# Patient Record
Sex: FEMALE | Race: WHITE | Employment: UNEMPLOYED | URBAN - METROPOLITAN AREA
[De-identification: names, ages, dates, MRNs, and addresses within clinical notes are randomized per-mention and may not be internally consistent; named-entity substitution may affect disease eponyms.]

---

## 2017-01-05 ENCOUNTER — HOSPITAL ENCOUNTER (OUTPATIENT)
Dept: WOUND CARE | Age: 82
Discharge: HOME | End: 2017-01-05
Payer: MEDICARE

## 2017-01-05 PROCEDURE — 97598 DBRDMT OPN WND ADDL 20CM/<: CPT

## 2017-01-05 PROCEDURE — 97597 DBRDMT OPN WND 1ST 20 CM/<: CPT

## 2017-01-05 PROCEDURE — 74011000250 HC RX REV CODE- 250: Performed by: OTOLARYNGOLOGY

## 2017-01-05 RX ORDER — LIDOCAINE HYDROCHLORIDE 20 MG/ML
JELLY TOPICAL
Status: COMPLETED | OUTPATIENT
Start: 2017-01-05 | End: 2017-01-05

## 2017-01-05 RX ADMIN — LIDOCAINE HYDROCHLORIDE: 20 JELLY TOPICAL at 10:29

## 2017-01-05 NOTE — PROGRESS NOTES
1500 [facility] [place] [address]   WOUND CARE PROGRESS NOTE       Name:  Jaqui Colorado   MR#:  [id number]   :  10/05/1931   Account #:  [de-identified]        Date of Adm:  2017       DATE OF SERVICE: 2017. 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 She is finally scheduled for ABIs, TBIs, and PVRs which will be done 6   days from today. She has had no changes in her medications, allergies, or review of   systems. OBJECTIVE: His temperature is 98.2, pulse 64, respirations 18, blood   pressure 125/78. Wound #1, which is the left lateral anterior lower leg   wound, measures 2.5 x 2.3 x 0.1 cm. It is filled with slough. Wound #4, which is the right distal lateral lower leg wound ,is 5.7 x 4.4   x 0.1 cm and also filled with slough and wound #5, which is the right   posterior lower leg wound, measures 1 x 2 x 0.1 cm and is also filled   with slough. Topical Xylocaine gel 4% was applied to all 3 wounds for 15 minutes. I   explained the risks and benefits of wound debridement. The patient   understands and wishes to proceed. Therefore, with the use of a 5 mm   ring curette, all 3 wounds were completely debrided of slough and   debris and was taken down to the level of underlying muscle. Hemostasis was achieved with gentle pressure. ASSESSMENT AND PLAN: We are going to switch the dressing to   [facility] Ready which will stay in place for a week. We will apply   a double layer of Tubigrips. I have again asked the patient to elevate   her legs, which she is not doing that all. Her family is going to order a   hospital bed to try to help her keep the legs in a more elevated   position. She will have her vascular studies in 6 days.  I will see her   again in followup in 1 week and we will decide at that time if she could tolerate compression 3-layer wraps. She does have a history of heart   failure and, therefore, we have to go slowly and judiciously with the   compression. All questions were answered. CONDITION ON DISCHARGE: Stable.         MD Clare Starr / Ram[facility]   D:  01/05/2017   11:18   T:  01/05/2017   11:35   Job #:  [id number]

## 2017-01-11 ENCOUNTER — HOSPITAL ENCOUNTER (OUTPATIENT)
Dept: VASCULAR SURGERY | Age: 82
Discharge: HOME | End: 2017-01-11
Attending: OTOLARYNGOLOGY
Payer: MEDICARE

## 2017-01-11 PROCEDURE — 93922 UPR/L XTREMITY ART 2 LEVELS: CPT

## 2017-01-11 NOTE — PROCEDURES
[facility]  *** FINAL REPORT ***    Name: Alan Pitts  MRN: [id number]    Outpatient  : 05 Oct 1931  HIS Order #: [id number]  [address] [facility] Visit #: [id number]  Date: 2017    TYPE OF TEST: Peripheral Arterial Testing    REASON FOR TEST  Extremity ulceration (both sides)    Right Leg  Segmentals: Normal                     mmHg  Brachial         137  High thigh  Low thigh  Calf  Posterior tibial 155  Dorsalis pedis   160  Peroneal  Metatarsal  Toe pressure      86  Doppler:  PVR:  Ankle/Brachial: 1.08    Left Leg  Segmentals: Normal                     mmHg  Brachial         148  High thigh  Low thigh  Calf  Posterior tibial 198  Dorsalis pedis   159  Peroneal  Metatarsal  Toe pressure      87  Doppler:  PVR:  Ankle/Brachial: 1.34    INTERPRETATION/FINDINGS  PROCEDURE:  Evaluation of lower extremity arteries with systolic blood   pressure measurement at the ankle and brachial level and calculation  of the ankle/brachial pressure index (DUSTIN). Unless otherwise  indicated, the exam also includes pulse volume recording (PVR)  plethysmography at the ankle level. FINDINGS:  DUSTIN is 1.08 on the right and  1.34 falsely elevated on the  left . PVR waveforms are mild at the right and left ankle. IMPRESSION:  There is no evidence of hemodynamically significant lower   extremity arterial obstruction . Great toe preasure are normal.    ADDITIONAL COMMENTS    I have personally reviewed the data relevant to the interpretation of  this  study. TECHNOLOGIST: Pino Amato.  Yusra Carter  Signed: 2017 03:55 PM    PHYSICIAN: Michele Peng MD  Signed: 2017 07:22 AM

## 2017-01-12 ENCOUNTER — HOSPITAL ENCOUNTER (OUTPATIENT)
Dept: WOUND CARE | Age: 82
Discharge: HOME | End: 2017-01-12
Payer: MEDICARE

## 2017-01-12 PROCEDURE — 29581 APPL MULTLAYER CMPRN SYS LEG: CPT

## 2017-01-12 RX ORDER — LIDOCAINE HYDROCHLORIDE 20 MG/ML
JELLY TOPICAL
Status: COMPLETED | OUTPATIENT
Start: 2017-01-12 | End: 2017-01-12

## 2017-01-12 RX ADMIN — LIDOCAINE HYDROCHLORIDE 2 ML: 20 JELLY TOPICAL at 10:26

## 2017-01-12 NOTE — PROGRESS NOTES
1500 [facility] [place] [address]   WOUND CARE PROGRESS NOTE       Name:  Olga Emanuel   MR#:  [id number]   :  10/05/1931   Account #:  [de-identified]        Date of Adm:  2017       DATE OF SERVICE: 2017    SUBJECTIVE: The patient returns for her bilateral venous leg ulcers   (I87.313), which goes down to the layer of fat (L97.912), with   secondary lymphedema (I89.0). She had vascular studies, which I reviewed and interpreted for her and   her daughter. Her right DUSTIN was 1.08, with normal segmentals, and her   toe pressure was 0.63. The left DUSTIN was 1.34, which is falsely elevated,   her toe pressures is 0.59, and she has mild attenuation at both ankles. I reviewed her chart, and a little confused, because one says that she   is on Bumex 2 mg/day, another one says she is on Bumex 4 mg/day   as well as spironolactone. She has had no other problems over the past week, no changes in her   medications, allergies or review of systems. OBJECTIVE: Her temperature is 97.2, pulse 56, respirations 16, blood   pressure 123/65. Wound #1, which is the left lateral anterior lower leg wound, is smaller   at 1.5 x 2 x 0.2 cm. Wound #4, which is the right distal lateral lower leg wound, is smaller   at 5 x 4 x 0.2 cm. Wound #5, the right posterior lower leg, is slightly   longer, yet narrower, at 2 x 1.3 x 0.2 cm. All wounds are covered with   slough. Wound debridement was recommended. I explained the risks   and benefits. She and her daughter understand and wish to proceed. Therefore, topical Xylocaine gel 2% was applied for 10 minutes. With   the use of a 5 mm ring curette, the wounds were debrided of all slough. Her legs are severely edematous. She has lymphedema, which   extends all the way to her toes.     ASSESSMENT AND PLAN: I have explained to the patient and her   daughter that we are going to switch to [facility] Ready, and   instead of the double Tubigrips, we will apply 3-layer wraps. Instead of   using 50% stretch, we are going to use only 25% stretch, in an attempt   to give mild compression without tipping her over into heart failure. I   called Dr. Chantal Ghosh and left a voicemail, and I would like to discuss   with him her diuretics and whether or not these can be increased, and   just have him aware that she might have issues with mobilizing all the   leg edema. At 8 cm from the medial instep, her right ankle was 29 cm in   circumference, and the left is 31. At 30 cm from the medial instep, the   right calf is 44.5, and the left is 52.5 cm, which is showing she has   much more edema on the left than on the right. All questions were answered. Her condition on discharge is stable. She   was encouraged again to elevate her legs at all times, and we are   going to call her nursing home to make sure they know, and her   daughter is going to call her nursing home to make sure they are also   in compliance.         MD DALLAS Olivera / [facility]   D:  01/12/2017   10:58   T:  01/12/2017   11:27   Job #:  [id number]

## 2017-01-19 ENCOUNTER — HOSPITAL ENCOUNTER (OUTPATIENT)
Dept: WOUND CARE | Age: 82
Discharge: HOME | End: 2017-01-19
Payer: MEDICARE

## 2017-01-19 PROCEDURE — 97597 DBRDMT OPN WND 1ST 20 CM/<: CPT

## 2017-01-19 PROCEDURE — 97598 DBRDMT OPN WND ADDL 20CM/<: CPT

## 2017-01-19 RX ORDER — LIDOCAINE HYDROCHLORIDE 20 MG/ML
JELLY TOPICAL
Status: COMPLETED | OUTPATIENT
Start: 2017-01-19 | End: 2017-01-19

## 2017-01-19 RX ADMIN — LIDOCAINE HYDROCHLORIDE 5 ML: 20 JELLY TOPICAL at 13:30

## 2017-01-19 NOTE — PROGRESS NOTES
1500 [facility] [place] [address]   WOUND CARE PROGRESS NOTE       Name:  Juana Robins   MR#:  [id number]   :  10/05/1931   Account #:  [de-identified]        Date of Adm:  2017       DATE OF SERVICE: 2017    SUBJECTIVE: The patient returns for treatment of her bilateral venous   leg ulcers (I87.313), which go down to the level of fat (L97.912) with   secondary lymphedema (I89.0). Last week, we applied Hydrofera Blue Ready and 3-layer wraps   bilaterally with only 25% stretch. I spoke to Dr. Claudio Tillman and we   discussed the plan of her legs with  elevation, compression and   diuretics. I just need him to be aware of her change in treatment so   that if we tipped her over into heart failure, he would be aware of her status. She has tolerated 3-layer wraps without difficulty over the week. She   denies any changes in her medications, allergies or review of systems. OBJECTIVE: Her temperature today is 97.9, pulse 60, respirations 16,   blood pressure 106/67. The edema of lower extremities persist, but the legs are not nearly as   taut as previously. She still has 3 wounds. Wound #1 of the left lateral   anterior lower leg is 2 x 2 x 0.2 cm with a layer of slough over it; wound   #4 of the right distal lateral lower leg is 5.3 x 3.3 x 0.2 cm; and wound   #5 of the right posterior lower leg is 1.5 x 1 x 0.1 cm. I have explained to the patient the indications for wound debridement   as well as the possible risks and benefits. She understands and wishes   to proceed. Therefore, topical Xylocaine gel 2% applied for 10 minutes. Using the 5-mm ring curette, all wounds were debrided of slough to get   down to underlying healthy bleeding soft tissue. Hemostasis was   achieved with gentle pressure. ASSESSMENT AND PLAN: For the bilateral venous leg ulcers, we are   going to use Debbie AG since there is no sign of infection.  We are   going to increase the strength of compression to 50% stretch. I again   spoke to the patient at length about the need to keep her legs elevated   at all times. She will return to the office in 1 week. All questions were   answered. CONDITION ON DISCHARGE: Stable.         MD DALLAS Galvin / MONIKA   D:  01/19/2017   13:32   T:  01/19/2017   13:58   Job #:  [id number]

## 2017-01-26 ENCOUNTER — HOSPITAL ENCOUNTER (OUTPATIENT)
Dept: WOUND CARE | Age: 82
Discharge: HOME | End: 2017-01-26
Payer: MEDICARE

## 2017-01-26 PROCEDURE — 97598 DBRDMT OPN WND ADDL 20CM/<: CPT

## 2017-01-26 PROCEDURE — 97597 DBRDMT OPN WND 1ST 20 CM/<: CPT

## 2017-01-26 RX ORDER — LIDOCAINE HYDROCHLORIDE 20 MG/ML
JELLY TOPICAL
Status: COMPLETED | OUTPATIENT
Start: 2017-01-26 | End: 2017-01-26

## 2017-01-26 RX ADMIN — LIDOCAINE HYDROCHLORIDE: 20 JELLY TOPICAL at 09:35

## 2017-01-26 NOTE — PROGRESS NOTES
[address] [facility] [place] [address]   WOUND CARE PROGRESS NOTE       Name:  Juana Robins   MR#:  [id number]   :  10/05/1931   Account #:  [de-identified]        Date of Adm:  2017       DATE OF SERVICE: 2016. SUBJECTIVE: The patient returns for her bilateral venous leg ulcers,   I87.313, which go down to the level of fat, L97.912. She has still been sitting with her legs in recombinant dependent   position. She has had no changes in her medications, allergies, or   review of systems. She is tolerating 3-layer wraps without difficulty. OBJECTIVE: Her temperature is 97.9, pulse 67, respiration 18, blood   pressure 111/69. Wound #1 of the left lateral anterior lower leg is 3.5 x 2.5 x 0.1 cm. It is   covered with a layer of slough and wound debridement has been   recommended. Wound #4 the right distal lateral lower leg is 5.5 x 3.5 x 0.1 cm, which   also had a layer of slough and wound #5, the right posterior lower leg,   is 2.1 x 0.1 cm and is clean. I explained the risks and benefits of wound debridement. The patient   understands and wishes to proceed. Therefore, topical Xylocaine gel   2% was applied for 10 minutes. With use of a 5 mm ring curette, the   wounds were debrided of slough. There is nice healthy granulation   tissue in the wound beds bilaterally. Hemostasis was achieved with   pressure until dry. ASSESSMENT AND PLAN: For the venous leg ulcers, we are going to   continue Debbie AG for the next week, followed by 3-layer wraps. I   spoke to the patient's daughter about keeping her legs elevated and   considering keeping her in a reclining chair or keeping her in bed more   and hopefully she will be able to do that, I spoke to her at length about   this, the patient, but due to her memory issues, she was unable to   recall this information. All questions were answered. CONDITION ON DISCHARGE: Stable.         Jon Gamez MD Luis Herrera / Mary Kate.[facility]   D:  01/26/2017   10:11   T:  01/26/2017   12:39   Job #:  [id number]

## 2017-02-02 ENCOUNTER — APPOINTMENT (OUTPATIENT)
Dept: WOUND CARE | Age: 82
End: 2017-02-02
Payer: MEDICARE

## 2017-02-09 ENCOUNTER — HOSPITAL ENCOUNTER (OUTPATIENT)
Dept: WOUND CARE | Age: 82
Discharge: HOME | End: 2017-02-09
Payer: MEDICARE

## 2017-02-09 PROCEDURE — 99214 OFFICE O/P EST MOD 30 MIN: CPT

## 2017-02-09 RX ORDER — LIDOCAINE HYDROCHLORIDE 20 MG/ML
JELLY TOPICAL AS NEEDED
Status: DISCONTINUED | OUTPATIENT
Start: 2017-02-09 | End: 2017-02-13 | Stop reason: HOSPADM

## 2017-02-09 NOTE — PROGRESS NOTES
1500 [facility] [place] [address]   WOUND CARE PROGRESS NOTE       Name:  Juana Robins   MR#:  [id number]   :  10/05/1931   Account #:  [de-identified]        Date of Adm:  2017       DATE OF SERVICE: 2017    SUBJECTIVE: The patient returns for her bilateral venous leg ulcers   and lymphedema. She was unable to keep last week's appointment so   we had home health apply 3-layer wraps. OBJECTIVE: Her temperature today is 98, pulse 78, respirations 18,   blood pressure 122/74. Examination showed that the patient had 3-  layer wraps on that were much too short and did not come up   anywhere near her knees. Therefore, she had very thin, tight distal   legs and ballooning proximally. Wound #1, which is the left lateral anterior lower leg wound is smaller,   2.6 x 2.0 x 0.1 cm. Wound #4, which is the right distal lateral lower leg   wound is small at 3.0 x 3.5 x 0.1 cm, and wound #5, which is the right   posterior lower leg wound is smaller at 2.0 x 1.0 x 0.1 cm. ASSESSMENT AND PLAN: For the venous leg ulcers and   lymphedema, we are going to continue Debbie AG and 3-layer wraps   and leg elevation. If the patient is unable to come for next week's visit,   we will give her TubiGrips to be applied rather than have 3-layer wraps   applied. We are going to call and speak to the nursing supervisor at   [facility] just to make sure that they understand how we wish for   these to be applied again in the future. All questions were answered. CONDITION ON DISCHARGE: Stable.         MD DALLAS Taylor / Jeanine Moore   D:  2017   11:13   T:  2017   11:28   Job #:  [id number]

## 2017-02-23 ENCOUNTER — APPOINTMENT (OUTPATIENT)
Dept: WOUND CARE | Age: 82
End: 2017-02-23
Payer: MEDICARE

## 2017-03-02 ENCOUNTER — HOSPITAL ENCOUNTER (OUTPATIENT)
Dept: WOUND CARE | Age: 82
Discharge: HOME | End: 2017-03-02
Payer: MEDICARE

## 2017-03-02 PROCEDURE — 97597 DBRDMT OPN WND 1ST 20 CM/<: CPT

## 2017-03-02 PROCEDURE — 97598 DBRDMT OPN WND ADDL 20CM/<: CPT

## 2017-03-02 NOTE — PROGRESS NOTES
[address], [address] [place] [address]   WOUND CARE PROGRESS NOTE       Name:  Bear Chiang   MR#:  [id number]   :  10/05/1931   Account #:  [de-identified]        Date of Adm:  2017       DATE OF SERVICE: 2017    SUBJECTIVE: The patient returns for the first time since 2017. At that time, her wounds were small and doing quite well. She was   instructed to return on 2017. She was unable to keep that visit. She also missed 2017 and presents today. There have been no   other changes in her medications, allergies, or review of systems. Since she was unable to use her 3-layer wraps and they lost her   Tubigrips at her nursing home, the nurses came and applied Coban to   her most distal lower extremities just above the ankles. OBJECTIVE: Her temperature today is 97.8, pulse 64, respirations 16,   blood pressure 99/67. Both lower extremities show severe stricturing under the Coban and   the wounds are larger as a consequence of the lack of the 3-layer   wraps and with the use of other modalities. Wound #1, which is the left lateral anterior lower leg wound, is 2.5 x 3 x   0.1 cm; wound #4, the right distolateral lower leg, is 6 x 3 x 0.1 cm; and   wound #5, which is the right posterior lower leg wound, is improved to   1.5 x 0.5 x 0.1 cm. Wounds #1 and #4 are covered with slough. It is recommended that   these be debrided. I explained the risks and benefits. The patient   understands and wished to proceed. PROCEDURE: Therefore, topical Xylocaine gel 2% was applied for 10   minutes. With the use of a 5 mm ring curette, the wounds were   debrided until nice healthy bleeding tissue was encountered. Hemostasis was achieved with gentle pressure. ASSESSMENT AND PLAN: I have explained to the patient and her   daughter that the wounds have certainly taken a step back. We are   going to still use Debbie AG and 3-layer wraps.  I strongly   recommended leg elevation at all times. The patient should even wear   a note pinned to her shirt so that whenever the nurses walk by they will   be reminded that her legs should be elevated. The patient and her daughter   were each given a set of double Tubigrips to be used so that if the   patient does not come to next week's visit, they will have Tubigrips   available, but hopefully she will be able to keep her appointment so we   can start making progress again with the status of these wounds. All questions were answered. Her condition on discharge is stable. She   will return in 1 week and will see Dr. Anny Boykin during my absence from   the office.          MD DALLAS Borjas / RAFAL   D:  03/02/2017   12:02   T:  03/02/2017   13:04   Job #:  [id number]

## 2017-03-09 ENCOUNTER — HOSPITAL ENCOUNTER (OUTPATIENT)
Dept: WOUND CARE | Age: 82
Discharge: HOME | End: 2017-03-09
Payer: MEDICARE

## 2017-03-09 PROCEDURE — 97597 DBRDMT OPN WND 1ST 20 CM/<: CPT

## 2017-03-09 RX ORDER — LIDOCAINE HYDROCHLORIDE 20 MG/ML
JELLY TOPICAL
Status: COMPLETED | OUTPATIENT
Start: 2017-03-09 | End: 2017-03-09

## 2017-03-09 RX ADMIN — LIDOCAINE HYDROCHLORIDE: 20 JELLY TOPICAL at 10:57

## 2017-03-09 NOTE — PROGRESS NOTES
295 [address], [address] [place] [address]   WOUND CARE PROGRESS NOTE       Name:  Bear Chiang   MR#:  [id number]   :  10/05/1931   Account #:  [de-identified]        Date of Adm:  2017       DATE OF SERVICE: 2017    SUBJECTIVE: The patient is seen in followup for venous stasis   disease bilaterally. She looks much better than the last time I saw her,   although the measurements of her wounds were not much better than   this last week. OBJECTIVE: On physical examination, she is interactive, but she falls   asleep quite easily and has been noticed previously, there is a   component of dementia. VITAL SIGNS: Today include a temperature 97.6, pulse is 64 and   regular, respiratory rate is 16 and blood pressure is 122/76. EXTREMITIES: Examination of her lower extremities shows me that   basically there are 3 wounds that are still of some concern. The left   lateral anterior leg has a wound that is    2.5 x 2.8 x 0.1. The depth of this wound actually looks like it is   epithelialized, although my original worry was that it was fibrous exudate. As I attempted to debride that there was a little bleeding, which I   touched with silver nitrate. The right distal lower leg is 6 x 3 x 0.1, has   a red nodular base and actually looks pretty healthy as does the right   posterior lower leg, which is 1.5 x 0.7 x 0.1. The right leg seems   basically unchanged and has rather healthy-looking tissue in the base   with benign edges and benign periwound. The left wound   has that curious, almost epithelialized look in the base and I limited my   debridement on that account. The debridement was undertaken after the patient's permission   on record for lidocaine, which was allowed to become effective, and it   was tolerated quite well with the bleeding controlled as noted with   silver nitrate cautery.  She will be back in just about a week so that we   can reassess the situation and consider perhaps endoform or PuraPly if that is    available for the right lateral leg.         Osborne Mate, MD Claudene Divine Diania Blamer   D:  03/09/2017   12:31   T:  03/09/2017   14:17   Job #:  [id number]

## 2017-03-16 ENCOUNTER — HOSPITAL ENCOUNTER (OUTPATIENT)
Dept: WOUND CARE | Age: 82
Discharge: HOME | End: 2017-03-16
Payer: MEDICARE

## 2017-03-16 PROCEDURE — 29581 APPL MULTLAYER CMPRN SYS LEG: CPT

## 2017-03-16 NOTE — PROGRESS NOTES
1500 [facility] [place] [address]   WOUND CARE PROGRESS NOTE       Name:  Fe Lindquist   MR#:  [id number]   :  10/05/1931   Account #:  [de-identified]        Date of Adm:  2017       DATE OF SERVICE: 2017. SUBJECTIVE: The patient returns with her daughter for her bilateral   venous leg ulcers (I87.313). We have been using Debbie AG and 3-  layer wraps. She has had no problems over the past week, nor   changes in her medications, allergies, or review of systems. OBJECTIVE: Today, she is quite tired and she is sleeping comfortably, but   easily arousable. Her pulse is 59, respirations 22, blood pressure   128/73. Wound #1, which is the wound of the left lateral anterior lower leg, is   slightly larger at 3 x 2.8 x 0.1 cm and the 3-layer wrap and slipped down   a bit and is not under the knee. Wound #4, the right distal lateral lower leg wound, is unchanged at 6 x   3 x 0.1 cm, but is quite clean and healthy with nice beefy red granular   base. Wound #5, which the right posterior lower leg, is unchanged in its   dimensions at 1.5 x 0.7 x 0.1 cm. Also a nice clean healthy wound. ASSESSMENT AND PLAN: For the venous leg ulcers, we are going to   reapply Debbie AG and 3-layer wraps. I am going to order PuraPly to   apply to all 3 wounds since I believe these wounds might be chronically   colonized and I would like to try that modality for 3 or 4 weeks. Even if the   wounds are not improving at that time, I would then switch to Grafix   since we should have healthier wound bases. The patient will continue to elevate her legs as much as possible. She   is supposed to get the new extensions of her wheelchair, so that her   legs can be elevated and hopefully this will help her compliance. All   questions were answered. CONDITION ON DISCHARGE: Stable.         MD Fox Borjas / Bianca   D:  2017   11:21   T:  2017   11:42   Job #:  [id number]

## 2017-03-23 ENCOUNTER — HOSPITAL ENCOUNTER (OUTPATIENT)
Dept: WOUND CARE | Age: 82
Discharge: HOME | End: 2017-03-23
Payer: MEDICARE

## 2017-03-23 PROCEDURE — 15271 SKIN SUB GRAFT TRNK/ARM/LEG: CPT

## 2017-03-23 RX ORDER — LIDOCAINE HYDROCHLORIDE 20 MG/ML
JELLY TOPICAL ONCE
Status: COMPLETED | OUTPATIENT
Start: 2017-03-23 | End: 2017-03-23

## 2017-03-23 RX ADMIN — LIDOCAINE HYDROCHLORIDE 5 ML: 20 JELLY TOPICAL at 11:20

## 2017-03-23 NOTE — PROGRESS NOTES
[address], [address] [place] [address]   WOUND CARE PROGRESS NOTE       Name:  Terri Greene   MR#:  [id number]   :  10/05/1931   Account #:  [de-identified]        Date of Adm:  2017       DATE OF SERVICE: 2017    SUBJECTIVE: The patient returns with her daughter. She has had a   good week. There have been no changes in her medications, allergies   or review of systems. When I first went into the room, she was asleep. She was arousable,   but she snores loudly and is sleep-deprived and the concern is   whether or not she has obstructive sleep apnea. I discussed this with   her daughter, but her daughter's concern is that the treatment would   be CPAP or BiPAP and she does not feel her mother would keep that   in place any event. She is going to sleep quite late at night and   sleeping all morning and it seems like she is sleeping more during the   day and less at night and they are going to work on her sleep hygiene   at her nursing home. OBJECTIVE: Her temperature today is 97.5, pulse 55, respirations 16,   blood pressure 149/79. Wound #1, which is the left lateral anterior lower leg, is smaller at 2.5 x   1.7 x 0.1 cm and is covered with a layer of slough. Wound #4, which is   the right distal lateral lower leg, is larger at 7.4 x 3 x 0.1 cm and has 3   distinct wounds with the middle one being the largest of the 3. All 3   are covered with slough. Wound #5, which is the wound of the right posterior lower leg, has now   healed and has a dry scab in place. I recommend that the wounds be debrided. I explained the risks and   benefits. The patient and her daughter understand and wish to   proceed. I also explained that we are going to apply PuraPly to the   wounds of both lower extremities to try to stimulate healing and to keep   the wounds clean and sterile. Therefore, topical Xylocaine gel 2% was applied for 10 minutes.  With   the use of a 5 mm ring curette, all wounds were debrided as a   selective debridement. All slough was removed. Hemostasis was   achieved with firm pressure. There was greater bleeding from the   wound of the right lower extremity than the left, so these veins were   under great pressure. Hemostasis was achieved with pressure. A 5 x 5 cm sheet of fenestrated  PuraPly was then cut into 4 pieces and used to cover the wounds of   both lower extremities. Skin prep was applied, followed by Mepitel,   followed by more skin prep and Steri-Strips. The wounds were then   covered with dry gauze and bilateral 3-layer wraps. ASSESSMENT AND PLAN: For the bilateral venous leg ulcers, we are   going to encourage the patient to keep her legs elevated at all times. No dressing changes should be done at the nursing home this week. If   the wraps become dislodged or saturated, they will simply call us and   the patient will come for a nursing visit at which time the 3-layer wraps   will be reapplied by our nurses here. The patient will return to this office in 1 week. We will reapply   PuraPly at that time. All questions were answered. CONDITION ON DISCHARGE: Stable.          MD DALLAS Wolfe / Giovany Palacios   D:  03/23/2017   11:48   T:  03/23/2017   12:16   Job #:  [id number]

## 2017-03-30 ENCOUNTER — HOSPITAL ENCOUNTER (OUTPATIENT)
Dept: WOUND CARE | Age: 82
Discharge: HOME | End: 2017-03-30
Payer: MEDICARE

## 2017-03-30 PROCEDURE — 15271 SKIN SUB GRAFT TRNK/ARM/LEG: CPT

## 2017-03-30 NOTE — PROGRESS NOTES
[address]   [place], [address]   WOUND CARE PROGRESS NOTE       Name:  Nancy Osei   MR#:  [id number]   :  10/05/1931   Account #:  [de-identified]        Date of Adm:  2017       The patient returns for her bilateral venous leg ulcers, which only   involve skin breakdown (I87.313, L97.911). She has had a good week. She has had no problems. No changes in her medications, allergies or   12-point review of systems. Temperature 97.8, pulse 66, respirations 18, blood pressure 105/63. Wound #1, which is the wound of the left lateral anterior lower leg, is   2.5 x 2 x 0.1 cm. It is still with nice clean, healthy granulation tissue   and is not in need of debridement. Wound #4, which is 3 wounds of the right distal lateral lower leg,   measures 7.5 x 3.2 x 0.1 cm. These 3 wounds are also clean and not in   need of debridement. The previous wound of the posterior leg remains   healthy and intact and healed. I again recommend that we reapply PuraPly to the wounds. I explained   the risks and benefits, she and her daughter understand and wish to   proceed. Therefore, a 5 x 5 cm sheet of PuraPly was cut and placed   over the 4 individual wounds to cover each wound in its entirety. These   were then covered with Mepitel followed by skin prep and Steri-Strips   followed by bilateral 3-layer wraps. ASSESSMENT AND PLAN: For the bilateral venous leg ulcers, we are   going to continue the PuraPly this week and 3-layer wraps, which will   stay in place for the week. The patient will keep her legs elevated as   much as possible. We will see her again in followup in 1 week. We will   plan on repeating the PuraPly at that time and as soon as the wounds   are stable enough and healthy enough, we will then switch to Grafix to   see if we can get wound closure. All questions were answered. CONDITION ON DISCHARGE: Stable.          MD DALLAS Babin / [place]annah CUELLO: 03/30/2017   11:01   T:  03/30/2017   11:28   Job #:  [id number]

## 2017-04-06 ENCOUNTER — HOSPITAL ENCOUNTER (OUTPATIENT)
Dept: WOUND CARE | Age: 82
Discharge: HOME | End: 2017-04-06
Payer: MEDICARE

## 2017-04-06 PROCEDURE — 15271 SKIN SUB GRAFT TRNK/ARM/LEG: CPT

## 2017-04-06 RX ORDER — LIDOCAINE HYDROCHLORIDE 20 MG/ML
JELLY TOPICAL ONCE
Status: COMPLETED | OUTPATIENT
Start: 2017-04-06 | End: 2017-04-06

## 2017-04-06 RX ADMIN — LIDOCAINE HYDROCHLORIDE 5 ML: 20 JELLY TOPICAL at 11:11

## 2017-04-06 NOTE — PROGRESS NOTES
[address] [facility] [address] [place] [address], [address]   WOUND CARE PROGRESS NOTE       Name:  Luis Decker   MR#:  [id number]   :  10/05/1931   Account #:  [de-identified]        Date of Adm:  2017       DATE OF SERVICE: 2017    SUBJECTIVE: The patient returns for her bilateral venous leg ulcers,   which only involves skin breakdown (I87.313, L97.911). She has had a   good week. There have been no changes in her medications, allergies,   or 12-point review of systems. OBJECTIVE: She is awake and alert today, which is a welcome sign. Her temperature is 98, pulse 60, respirations 16, blood pressure 99/62. Wound #1, which is one of the left lateral anterior lower leg, is smaller   at 2.4 x 1.8 x 0.1 cm, and the 3 wounds that make wound #4 of the   right distal lateral lower leg are also significantly smaller at 5 x 3 x 0.1   cm. It is recommended that the wound be debrided prior to application of   apply PuraPly just to remove slough and any devascularized tissue. I   explained the risks and benefits. She understands and wishes to   proceed. Therefore, topical Xylocaine gel 2% was applied for 10   minutes. With use of a 7 mm ring curette, all 4 wounds were debrided   to get down to underlying healthy bleeding tissue. Then 2 pieces of 2 x   4 cm PuraPly were placed over the primary wounds, which is the   middle wound on the right side and the wound of the left lower   extremity. Then small pieces of Endoform were cut to place over the   proximal and distal wounds of the right lower extremity. The periwound   wound was prepped with skin prep, followed by application of Mepitel   and Steri-Strips, followed by bilateral 3-layer wraps. ASSESSMENT AND PLAN: For the bilateral venous leg ulcers, which   only involves skin breakdown, the patient will continue to elevate her   legs.  She will continue to do gastrocnemius muscle exercises to   improve venous outflow, and I will see her again in followup in 1 week. All questions were answered. CONDITION ON DISCHARGE: Stable.         MD DALLAS Melchor / RAFAL   D:  04/06/2017   12:04   T:  04/06/2017   13:56   Job #:  [id number]

## 2017-04-13 ENCOUNTER — HOSPITAL ENCOUNTER (OUTPATIENT)
Dept: WOUND CARE | Age: 82
Discharge: HOME | End: 2017-04-13
Payer: MEDICARE

## 2017-04-13 PROCEDURE — 17250 CHEM CAUT OF GRANLTJ TISSUE: CPT

## 2017-04-13 PROCEDURE — 29581 APPL MULTLAYER CMPRN SYS LEG: CPT

## 2017-04-13 PROCEDURE — 74011000250 HC RX REV CODE- 250: Performed by: OTOLARYNGOLOGY

## 2017-04-13 RX ORDER — LIDOCAINE HYDROCHLORIDE 20 MG/ML
JELLY TOPICAL
Status: COMPLETED | OUTPATIENT
Start: 2017-04-13 | End: 2017-04-13

## 2017-04-13 RX ADMIN — LIDOCAINE HYDROCHLORIDE: 20 JELLY TOPICAL at 11:38

## 2017-04-13 NOTE — PROGRESS NOTES
[address] [facility] [address] [place] [address] [facility]   WOUND CARE PROGRESS NOTE       Name:  Juhi Hooks   MR#:  [id number]   :  10/05/1931   Account #:  [de-identified]        Date of Adm:  2017       DATE OF SERVICE: 2017    SUBJECTIVE: The patient returns with no new complaints or problems,nor  changes in her medications, allergies or review of systems. She is   being treated for bilateral venous leg ulcers (I87.313) which only   involves skin breakdown (L97.911). There have been no changes in her medications, allergies or review of   systems since last seen. OBJECTIVE   VITAL SIGNS: Temperature is 97.5, pulse 75, respirations 16, blood   pressure 115/75. RIGHT LOWER EXTREMITY: Examination of the right lower extremity   shows excellent management of the edema. The wounds are 5.5 x 3 x   0.1 cm. She recently had 3 wounds in a line along the anterior lower   extremity. The more proximal one is now only 0.1 x 0.1 x 0.1, so she still   has 2 residual wounds. The central one and the distal one are more   hypergranular, probably of a consequence of using the colloid, but they   are clean and not in need of debridement. Wound #1, which is the left lateral anterior lower leg wound, is 2.5 x 2 x   0.1 cm. This leg has significant compression just above the ankle and   the remainder of the leg, more proximally, has completely ballooned up   and is severely edematous. She is nontender over her popliteal vein. There is no erythema and there is no evidence of a deep vein   thrombosis. It is presumed that her dressing has again slipped down   distally and is causing distal compression. The wound, itself,   is hypergranular, again, as a consequence of the colloid. I recommend that these wounds be cauterized. Therefore, topical   Xylocaine gel, 2%, was applied for 10 minutes.  This was wiped off and   with a silver nitrate stick, the wound of the left lower extremity and the   2 distal wounds of the right lower extremity were cauterized to create   involution of all the hypertrophic tissue. This was then irrigated off with   sterile saline and patted dry. ASSESSMENT/PLAN: For the hypergranular wounds in a patient with   bilateral venous leg ulcers, we are going to hold the PuraPly and apply   Mepilex AG foam followed by 3-layer wraps. We are going to try to bolster   the distal portion of the dressings so that they are more of a cylinder and less   inclined to slip distally. I spoke to the patient's daughter at great length about the need for leg   elevation. The patient has obviously not been keeping her legs   elevated at all, and they are severely edematous. If she is not   improved at next week's visit, I am going to be inclined to place her on   bedrest to keep her legs elevated, rather than having her sitting all day   in a wheelchair with her legs in dependent position. All questions were   answered. CONDITION ON DISCHARGE: Stable.         MD DALLAS Melchor / MONIKA   D:  04/13/2017   12:09   T:  04/13/2017   13:31   Job #:  [id number]

## 2017-04-20 ENCOUNTER — HOSPITAL ENCOUNTER (OUTPATIENT)
Dept: WOUND CARE | Age: 82
Discharge: HOME | End: 2017-04-20
Payer: MEDICARE

## 2017-04-20 PROCEDURE — 97597 DBRDMT OPN WND 1ST 20 CM/<: CPT

## 2017-04-20 NOTE — PROGRESS NOTES
[address]   [address] [facility], [address] [place] [address]   WOUND CARE PROGRESS NOTE       Name:  Meghana Cobb   MR#:  [id number]   :  10/05/1931   Account #:  [de-identified]        Date of Adm:  2017       DATE OF SERVICE: 2017     SUBJECTIVE: The patient returns for her bilateral venous leg ulcers   (I87.313), which only involve skin breakdown (L97.911). Last week, we   stopped the PuraPly, we cauterized the wounds and applied Mepilex   AG foam and bilateral 3-layer wraps. She has had a good week, there   have been no problems, no changes in her medications, allergies or   review of systems. OBJECTIVE: Her temperature today is 97.5, pulse 60, respirations 16   and blood pressure 110/63. Wound #1, which is the wound of the left lateral anterior lower leg, is   slightly smaller at 2.2 x 2 x 0.1 cm. The periphery of the wound is the   healthiest portion. There is devascularized tissue with slough in the mid-   portion of the wound. Wound #4, the right distal lateral lower leg wound, is significantly   smaller because the proximal wound of the cluster has healed. The   dimensions today are 2.5 x 2.8 x 0.1 cm. These 2 wounds continue to   have slough in the mid portion of the wounds. I recommended that the wounds be debrided. I explained the risks and   benefits, the patient understands. Therefore, topical Xylocaine gel 2%   was applied for 15 minutes, and with the use of a 5 mm ring curette,   wound #1 and the 2 wounds of wound #4 were debrided down through   full-thickness skin to remove all necrotic material. Hemostasis was   achieved with gentle pressure. The hypertrophic tissue of the large wound on the right side was then   cauterized with silver nitrate to create involution of the hypertrophic   tissue. It was then irrigated with saline and patted dry.     ASSESSMENT AND PLAN: For the bilateral venous leg ulcers, we are   going to stay with the Mepilex AG foam this week, followed by 3-layer   wraps. I have ordered the patient on bed rest, unless she is able to sit   in a chair with her leg elevated at her nursing home. All questions were   answered. Her condition on discharge is stable.         MD DALLAS Meade / [facility]   D:  04/20/2017   12:00   T:  04/20/2017   12:20   Job #:  [id number]

## 2017-04-27 ENCOUNTER — HOSPITAL ENCOUNTER (OUTPATIENT)
Dept: WOUND CARE | Age: 82
Discharge: HOME | End: 2017-04-27
Payer: MEDICARE

## 2017-04-27 PROCEDURE — 97597 DBRDMT OPN WND 1ST 20 CM/<: CPT

## 2017-04-27 PROCEDURE — 74011000250 HC RX REV CODE- 250: Performed by: OTOLARYNGOLOGY

## 2017-04-27 RX ORDER — LIDOCAINE HYDROCHLORIDE 20 MG/ML
JELLY TOPICAL
Status: COMPLETED | OUTPATIENT
Start: 2017-04-27 | End: 2017-04-27

## 2017-04-27 RX ADMIN — LIDOCAINE HYDROCHLORIDE 5 ML: 20 JELLY TOPICAL at 11:01

## 2017-04-27 NOTE — PROGRESS NOTES
[address] [place] [address]   WOUND CARE PROGRESS NOTE       Name:  Bonilla Cochran   MR#:  [id number]   :  10/05/1931   Account #:  [de-identified]        Date of Adm:  2017       DATE OF SERVICE: 2017    SUBJECTIVE: The patient returns for to her bilateral venous leg ulcers   (I87.313), which only involve skin breakdown (L97.911). Over the week she did push her 3-layer wraps down and they were no   longer in place. She has had no other problems or changes in her   medications, allergies or review of systems. OBJECTIVE   VITAL SIGNS: Her temperature is 97.7, pulse 58, respirations 16,   blood pressure 134/80. LOWER EXTREMITIES: Wound #1, which is wound to the left lateral   anterior lower leg, is smaller at 2 x 1.8 x 0.1 cm and wound #4 of the   right distal lateral lower leg, is also smaller at 2.2 x 2.5 x 0.1 cm and   hypertrophic granulation tissue is also receding and is not nearly as   prominent as previous. The wounds are covered with a layer of slough,   and it is recommended that the wounds be  debrided. I explained the   risks and benefits. She understands and wishes to proceed. Therefore,   topical Xylocaine gel 2% was applied for 15 minutes. With the us of a   5-mm ring curette, the wounds were debrided down through all the   layers, down to healthy subcutaneous tissue. Hemostasis was   achieved with gentle pressure until dry. ASSESSMENT AND PLAN: The patient will continue to have her   dressings applied with Mepilex AG foam and bilateral 3-layer wrap. I have recommended again, bed rest, unless she is able to sit with her   legs elevated. She will return to see us in 1 week. All questions were   answered. CONDITION ON DISCHARGE: Stable.         MD DALLAS [place] / MONIKA   D:  2017   11:34   T:  2017   13:37   Job #:  [id number]

## 2017-05-04 ENCOUNTER — HOSPITAL ENCOUNTER (OUTPATIENT)
Dept: WOUND CARE | Age: 82
Discharge: HOME | End: 2017-05-04
Payer: MEDICARE

## 2017-05-04 PROCEDURE — 11042 DBRDMT SUBQ TIS 1ST 20SQCM/<: CPT

## 2017-05-04 NOTE — PROGRESS NOTES
[address] [facility] [address] [place] [address]   WOUND CARE PROGRESS NOTE       Name:  Nancy Osei   MR#:  [id number]   :  10/05/1931   Account #:  [de-identified]        Date of Adm:  2017       Ms. Ronaldo Jenkins returns for her bilateral venous leg ulcers, I87.313, L97.911. She has had an uncomplicated week with no new problems, no   changes in her medications, allergies, or review of systems. VITAL SIGNS: Temperature is 98.2, pulse 53, respirations 16, blood   pressure 131/73. 11 cm from the medial instep, the left ankle is 28 cm   and the right is 25. 34 from the medial instep, the left calf is 47.5   compared to the right of 44 showing that she is significantly more   edematous on the left and her compression stocking has not been in   appropriate position. Wound #1 which is the left lateral anterior lower leg wound last week   was 2 x 1.8 x 0.1 cm. Today it is minimally smaller at 2 x 1.7 x 0.1 cm. Wound #4 which is the right distal lateral lower leg, last time was 2.2 x   2.5 x 0.1 cm, today it is minimally smaller, 2 x 2.5 x 0.1 cm. All wounds were covered with devitalized tissue and slough, and it was   recommended that they be debrided. I explained the risks and benefits, the   patient understands and wishes to proceed; therefore, topical   Xylocaine, gel 2% was applied for 15 minutes. With the use of a 5 mm ring   curette, all the slough was removed, all the fibrosis was removed, and   nice healthy wound bases were encountered. The edges of the wound   were stripped out to get back to underlying healthy bleeding skin   edges. Hemostasis was achieved with firm pressure until dry. ASSESSMENT AND PLAN: The patient is obviously not able to keep   her three-layer wraps in place. We are going to apply Mepilex AG foam   again followed by bilateral three layer wraps.  We are also going to take   measurements today and order bilateral CircAid compression devices for her to use for the long-term. The thought being, the CircAids  might stay in place better and hopefully  will provide better   compression than she has been receiving with the three-  layer wraps. She is still not keeping her legs elevated even though this   has been explained to her on multiple occasions. She will return to the   office in one week. As soon as the CircAid's arrive, hopefully she and   her daughter will bring them to that weeks' visit. All questions were   answered. CONDITION ON DISCHARGE: Stable.         Beverley Sandhoff, MD      [facility] / Yanet aZmora   D:  05/04/2017   11:45   T:  05/04/2017   12:14   Job #:  [id number]

## 2017-05-11 ENCOUNTER — APPOINTMENT (OUTPATIENT)
Dept: WOUND CARE | Age: 82
End: 2017-05-11
Payer: MEDICARE

## 2017-05-18 ENCOUNTER — HOSPITAL ENCOUNTER (OUTPATIENT)
Dept: WOUND CARE | Age: 82
Discharge: HOME | End: 2017-05-18
Payer: MEDICARE

## 2017-05-18 PROCEDURE — 97597 DBRDMT OPN WND 1ST 20 CM/<: CPT

## 2017-05-18 RX ORDER — LIDOCAINE HYDROCHLORIDE 20 MG/ML
JELLY TOPICAL
Status: COMPLETED | OUTPATIENT
Start: 2017-05-18 | End: 2017-05-18

## 2017-05-18 RX ADMIN — LIDOCAINE HYDROCHLORIDE 5 ML: 20 JELLY TOPICAL at 12:00

## 2017-05-18 NOTE — PROGRESS NOTES
[address] [facility] [facility] [address] [place] [address]   WOUND CARE PROGRESS NOTE       Name:  Luis Decker   MR#:  [id number]   :  10/05/1931   Account #:  [de-identified]        Date of Adm:  2017       DATE OF SERVICE: 2017    SUBJECTIVE: The patient returns for her bilateral venous leg ulcers   (I87.313) which only involves skin breakdown (L97.911). Last week, she missed the appointment and when she presented   today, she had Coban on her left lower extremity and double Tubigrips   on the right. Interestingly, the right side is better appearing of the 2   sides. She has a blister on the left leg proximally where the Coban was   pinching her skin. OBJECTIVE: Her temperature is 97.5, pulse 62, respirations 18, blood   pressure 118/53. Wound #1, which is the left lateral anterior lower leg wound, is larger,   2.3 x 1.5 x 0.1 cm. Wound #4, which is the right distolateral lower leg,   is larger at 4 x 4 x 0.2 cm, which is larger in all dimensions. The patient has debris and slough in the wounds. I recommended   these wounds be debrided. I explained the risks and benefits. She   understands and wishes to proceed. Therefore, topical Xylocaine gel   2% was applied for 10 minutes. With use of a 7 mm ring curette, the   wounds were completely debrided to get down to underlying healthy   bleeding tissue. Hemostasis was achieved with gentle pressure until   dry. ASSESSMENT AND PLAN: We are going to switch the plan to [facility], followed by [facility], followed by 3-layer wraps. The patient   will try to keep her legs elevated more and this has obviously not been   a successful endeavor. She was also instructed on how to do   gastrocnemius muscle exercises to try to improve her venous outflow. She will return to the office for a nursing visit for dressing change in 1   week and I will see her again in followup in 2 weeks. All questions   were answered.  I explained to the patient and her daughter that we lost  ground over the past two weeks due to the missed appointment, and I  therefore encouraged to keep her appointment in one week and to elevate   her legs as much as possible. CONDITION ON DISCHARGE: Stable.          MD DALLAS Blank / RAFAL   D:  05/18/2017   12:18   T:  05/18/2017   14:00   Job #:  [id number]

## 2017-05-23 ENCOUNTER — APPOINTMENT (OUTPATIENT)
Dept: WOUND CARE | Age: 82
End: 2017-05-23
Payer: MEDICARE

## 2017-06-01 ENCOUNTER — HOSPITAL ENCOUNTER (OUTPATIENT)
Dept: WOUND CARE | Age: 82
Discharge: HOME | End: 2017-06-01
Payer: MEDICARE

## 2017-06-01 PROCEDURE — 11045 DBRDMT SUBQ TISS EACH ADDL: CPT

## 2017-06-01 PROCEDURE — 11042 DBRDMT SUBQ TIS 1ST 20SQCM/<: CPT

## 2017-06-01 RX ORDER — LIDOCAINE HYDROCHLORIDE 20 MG/ML
JELLY TOPICAL
Status: COMPLETED | OUTPATIENT
Start: 2017-06-01 | End: 2017-06-01

## 2017-06-01 RX ADMIN — LIDOCAINE HYDROCHLORIDE 5 ML: 20 JELLY TOPICAL at 10:56

## 2017-06-01 NOTE — PROGRESS NOTES
[address] [place] [facility] [address] [place] [address]   WOUND CARE PROGRESS NOTE       Name:  Bethany Verde   MR#:  [id number]   :  10/05/1931   Account #:  [de-identified]        Date of Adm:  2017       DATE OF SERVICE:  2017    SUBJECTIVE: The patient returns for her bilateral venostatic ulcers   (I87.313) which only involves skin breakdown (L97.911). She was last   seen on May 18. We used Debbie AG, Hydrofera boot and 3-layer   wrap. She was supposed to come back on May 24 or May 25 for a   nursing visit but her daughter did not arrange transportation and,   therefore, Xiomara Sherrie were used at her assisted living facility. There have been no other changes in her medications, allergies or rule   out. OBJECTIVE:  Her temperature today is 97.5, pulse 57, respirations 18,   blood pressure 119/70. Wound #1, which is the left lateral anterior lower leg, last time was 2.3 x   1.5 x 0.1 cm. Today it is 2.3 x 1.7 x 0.1 cm and covered with a layer of   slough. Wound #4, which is right distal lateral lower leg last time was 4 x 4 x   0.2 cm. Today it is larger 4.5 x 5.5 x 0.2 cm and also covered with   slough. Both feet are severely edematous since the Tubigrips were not   properly located and the Aquacel AG, which they had applied, was not   covering either wound. I recommended that the wounds be debrided. I explained the risks and   benefits. The patient and her daughter understand and wish to   proceed. Therefore, topical Xylocaine gel 2% was applied for 10   minutes. With use of a 7 mm ring curette very thick, tenacious,   adherent slough was removed from the superficial surface of both   wounds. Nice healthy, bleeding tissue was encountered and   hemostasis was achieved with firm pressure until dry. ASSESSMENT AND PLAN:  We are going to resume the Debbie AG   followed by Hydrofera boot and 3-layer wraps.   We are uncertain if her   CircAids have arrived in her room and her daughter will check on that   today. If they have not, we will reorder those since it might be easier   for this patient to use CircAids all the time so that if she is unable to   have a visit we will have the CircAids available to apply much firmer   pressure than she has been getting. She will again try to keep the legs elevated and do gastrocnemius   muscle exercises, and I will see her again in followup in 1 week. All   questions were answered. Her condition on discharge is stable.          Kerry Santiago MD      AK / MS   D:  06/01/2017   11:20   T:  06/01/2017   12:16   Job #:  [id number]

## 2017-06-08 ENCOUNTER — HOSPITAL ENCOUNTER (OUTPATIENT)
Dept: WOUND CARE | Age: 82
Discharge: HOME | End: 2017-06-08
Payer: MEDICARE

## 2017-06-08 PROCEDURE — 97597 DBRDMT OPN WND 1ST 20 CM/<: CPT

## 2017-06-08 RX ORDER — LIDOCAINE HYDROCHLORIDE 20 MG/ML
JELLY TOPICAL
Status: COMPLETED | OUTPATIENT
Start: 2017-06-08 | End: 2017-06-08

## 2017-06-08 RX ADMIN — LIDOCAINE HYDROCHLORIDE: 20 JELLY TOPICAL at 11:11

## 2017-06-08 NOTE — PROGRESS NOTES
[address] [place] [facility] [address] [place] [address] [place]   WOUND CARE PROGRESS NOTE       Name:  Kin Dakins   MR#:  [id number]   :  10/05/1931   Account #:  [de-identified]        Date of Adm:  2017       DATE OF SERVICE: 2017. The patient returns with her daughter for her bilateral venous leg ulcers   I87.313, L97.911. Her 3-layer wraps remained in place, but   unfortunately her daughter forgot to bring the CircAids which might or   might not be at the patient's bedside. She has had no problems over   the past week. No changes in her medications, allergies, or review of   systems. Her temperature is 97.6, pulse 55, respirations 16, blood   pressure 124/62. Wound #1, which is the wound of the left lateral   anterior lower leg, is smaller at 1.5 x 1.7 x 0.1 cm. It has granulation   tissue but fibrosis and slough. It is recommended that this wound be debrided. Wound #4, which is the right distal lateral lower leg wound, is smaller   at 4.5 x 3.2 x 0.1 cm. This wound is much flatter with the skin and does   not have the fibrosis, but does have a layer of slough. It is   recommended that this wound also be debrided. I explained the risks   and benefits. The patient understands and wishes to proceed. Therefore, topical Xylocaine gel 2% was applied for 10 minutes. With use the 5-mm ring curettes, both wounds were completely   debrided to get down to underlying healthy bleeding tissue. The fibrosis was removed on the left leg and nice healthy bleeding   subcutaneous tissue was encountered. Hemostasis was achieved with   gentle pressure until dry. ASSESSMENT AND PLAN: The patient is doing quite well on the   current regimen. We are going to continue Debbie AG, Hydrofera Blue,   and a 3-layer wrap. The patient's daughter will call us today to tell us if   the CircAids are at the patient's bedside.  If they are, she will bring   them to next week's visit so that they can be applied at that time,   possibly. In addition, if the patient's dressings and wraps fall down,   we would like to know that the CircAids are available so that patient   can have them applied at her nursing home in the future. All questions were   answered. CONDITION ON DISCHARGE: Stable.         MD Kerri Burkett / Samantha.Manjit   D:  06/08/2017   11:52   T:  06/08/2017   12:13   Job #:  [id number]

## 2017-06-15 ENCOUNTER — HOSPITAL ENCOUNTER (OUTPATIENT)
Dept: WOUND CARE | Age: 82
Discharge: HOME | End: 2017-06-15
Payer: MEDICARE

## 2017-06-15 ENCOUNTER — APPOINTMENT (OUTPATIENT)
Dept: WOUND CARE | Age: 82
End: 2017-06-15
Payer: MEDICARE

## 2017-06-15 PROCEDURE — 97597 DBRDMT OPN WND 1ST 20 CM/<: CPT

## 2017-06-22 ENCOUNTER — HOSPITAL ENCOUNTER (OUTPATIENT)
Dept: WOUND CARE | Age: 82
Discharge: HOME | End: 2017-06-22
Payer: MEDICARE

## 2017-06-22 PROCEDURE — 74011000250 HC RX REV CODE- 250: Performed by: OTOLARYNGOLOGY

## 2017-06-22 PROCEDURE — 97597 DBRDMT OPN WND 1ST 20 CM/<: CPT

## 2017-06-22 RX ORDER — LIDOCAINE HYDROCHLORIDE 20 MG/ML
JELLY TOPICAL
Status: COMPLETED | OUTPATIENT
Start: 2017-06-22 | End: 2017-06-22

## 2017-06-22 RX ADMIN — LIDOCAINE HYDROCHLORIDE: 20 JELLY TOPICAL at 11:02

## 2017-06-22 NOTE — PROGRESS NOTES
[address] [place] [facility] [address] [place] [address]   WOUND CARE PROGRESS NOTE       Name:  Shania Nava   MR#:  [id number]   :  10/05/1931   Account #:  [de-identified]        Date of Adm:  2017       DATE OF SERVICE: 2017    SUBJECTIVE: The patient returns for her bilateral venous leg ulcers   (I87.313 and L98.911). She has had a good week. She has had no   Problems and no changes in her medications, allergies or review of   systems. OBJECTIVE   GENERAL: Upon presentation today, she is awake, alert and   conversant, compared to her usual somnolent self. VITAL SIGNS: Temperature 97.2, pulse 65, respirations 20, blood   pressure 112/72. LOWER EXTREMITIES: Wound #1, which is the wound of the left   lateral anterior lower leg, is unchanged at 2 x 1 x 0.1 cm. It is covered   with a layer of slough. Wound #4, which is the right distal lateral lower   leg wound, is much smaller at 1.4 x 2.4 x 0.1 cm and also covered with   slough. It is recommended that the wounds be debrided. I explained the risks   and benefits. The patient understands and wishes to proceed. Therefore, topical Xylocaine gel, 2%, was applied for 15 minutes. With   the use of 5 mm ring curette, the wounds were debrided down to   underlying healthy granulation tissue. Nice healthy, bleeding tissue was   encountered and hemostasis was achieved with gentle pressure until   dry. ASSESSMENT/PLAN: The patient is doing very well on the current   therapy, therefore, we are going to continue using Debbie AG followed   by [facility] Blue to the periwound and 3-layer wraps. Her daughter   knows to bring the Circ-Aid light wraps in whenever they arrive, and   hopefully, we can switch over to that instead of the 3-layer wraps. Her   daughter will also have a podiatrist visit the patient at the nursing home   to trim her toenails, which is what she desperately wants. All questions are answered.      Her condition on discharge is stable. She will return in 1 week.          MD DALLAS Burnham / MONIKA   D:  06/22/2017   11:36   T:  06/22/2017   12:14   Job #:  [id number]

## 2017-06-29 ENCOUNTER — HOSPITAL ENCOUNTER (OUTPATIENT)
Dept: WOUND CARE | Age: 82
Discharge: HOME | End: 2017-06-29
Payer: MEDICARE

## 2017-06-29 PROCEDURE — 29581 APPL MULTLAYER CMPRN SYS LEG: CPT

## 2017-06-29 RX ORDER — LIDOCAINE HYDROCHLORIDE 40 MG/ML
SOLUTION TOPICAL
Status: COMPLETED | OUTPATIENT
Start: 2017-06-29 | End: 2017-06-29

## 2017-06-29 RX ADMIN — LIDOCAINE HYDROCHLORIDE 5 ML: 40 SOLUTION TOPICAL at 11:26

## 2017-06-30 NOTE — PROGRESS NOTES
1500 [place] [facility] [place] [address]   WOUND CARE PROGRESS NOTE       Name:  David Diaz   MR#:  [id number]   :  10/05/1931   Account #:  [de-identified]        Date of Adm:  2017       DATE OF SERVICE: 2017    SUBJECTIVE: The patient returns for her bilateral venous leg ulcers,   which only involve skin breakdown (I87.313). She has had a good   week. She has had no problems. No changes in her medications,   allergies or review of systems. Her Circ-Aid lights have still not yet   arrived. OBJECTIVE   VITAL SIGNS: Her temperature is 98.2, pulse 58, respirations 18,   blood pressure 133/60.   RIGHT LOWER EXTREMITY: Examination shows the right lower   extremity to be smaller than the left and the wounds of the right lower   extremity are smaller than last week. Last week, wound #4 of the right   lower extremity was 1.4 x 2.4 x 0.1 cm; today it is 1.3 x 1.7 x 0.1 cm. Wound #1, which is the wound of the left lateral anterior lower leg, last   time was 2 x 1 x 0.1 cm and with the more edematous leg, it is 2.5 x   1.9 x 0.1 cm. Both wounds are quite clean and not in need of debridement. They are   filled with nice, healthy granulation tissue. ASSESSMENT/PLAN: For this week, we are going to continue Debbie   AG, Hydrofera Blue and 3-layer wraps. I have again spoken with the   patient's daughter about her keeping her legs elevated to help   minimize the edema. We are going to order Grafix Prime 3 x 4 cm to   apply to both wounds next week,since the edema is as good as we   have ever had it managed, and the wound beds are healthier than   previously. All questions are answered. Her condition on discharge is stable.         MD DALLAS Ocasio / MONIKA   D:  2017   12:00   T:  2017   12:22   Job #:  [id number]

## 2017-07-06 ENCOUNTER — HOSPITAL ENCOUNTER (OUTPATIENT)
Dept: WOUND CARE | Age: 82
Discharge: HOME | End: 2017-07-06
Payer: MEDICARE

## 2017-07-06 PROCEDURE — 97597 DBRDMT OPN WND 1ST 20 CM/<: CPT

## 2017-07-06 RX ORDER — LIDOCAINE HYDROCHLORIDE 20 MG/ML
JELLY TOPICAL
Status: COMPLETED | OUTPATIENT
Start: 2017-07-06 | End: 2017-07-06

## 2017-07-06 RX ADMIN — LIDOCAINE HYDROCHLORIDE 5 ML: 20 JELLY TOPICAL at 12:06

## 2017-07-06 NOTE — PROGRESS NOTES
[address]   e [place] [address]   WOUND CARE PROGRESS NOTE       Name:  Purvi Baez   MR#:  [id number]   :  10/05/1931   Account #:  [de-identified]        Date of Adm:  2017       DATE OF SERVICE: 2017     SUBJECTIVE: The patient returns for her bilateral venous leg ulcers. Her CircAid has still not yet arrived. She has had no problems, nor   changes in medications, allergies or review of systems. OBJECTIVE: Her temperature is 97.3, pulse 58, respirations 18, blood   pressure 121/72. Examination shows the 3-layer wrap of the left leg has slipped down   and is just barely above her ankle. This leg is more swollen and the   wound is larger at 3.2 x 2.2 x 0.1 cm. The wound is quite soupy. Wound   #4 of the right lateral leg is essentially unchanged at 1.3 x 1.8 x 0.1 cm. It is recommended that the wounds be debrided. I explained the risks   and benefits, the patient understands and wishes to proceed. Therefore,   topical Xylocaine gel was applied for 10 minutes, and with the use of a   5 mm ring curette, all the sloughly, necrotic material was removed. Hemostasis was achieved with gentle pressure until dry. ASSESSMENT AND PLAN: We are going to change the dressing   slightly to Endoform, followed by [facility], followed by a   3-layer wrap. We are going to bolster the distal end to make the   dressing more cylindrical, so hopefully it will not fall down as much. We   were unable to use Grafix because we used PuraPly and then took a   hiatus, and she therefore will not have it approved by her insurance   carrier for another year. The patient will try to keep her leg elevated more. She will keep her   dressings and wraps in place, and I will see her again in 1 week. All questions were answered. Her condition on discharge is stable.              Michael Mayen MD      [place] / [facility] MINDY BLAS   D:  2017   12:34   T:  2017   15:37   Job #: [id number]

## 2017-07-13 ENCOUNTER — HOSPITAL ENCOUNTER (OUTPATIENT)
Dept: WOUND CARE | Age: 82
Discharge: HOME | End: 2017-07-13
Payer: MEDICARE

## 2017-07-13 PROCEDURE — 97597 DBRDMT OPN WND 1ST 20 CM/<: CPT

## 2017-07-14 NOTE — PROGRESS NOTES
1500 [facility] [place] [address]   WOUND CARE PROGRESS NOTE       Name:  Shaila Monteiro   MR#:  [id number]   :  10/05/1931   Account #:  [de-identified]        Date of Adm:  2017       DATE OF SERVICE:  2017     SUBJECTIVE:  The patient returns for her bilateral venous leg ulcers,   I87.313, which only involve skin breakdown, L97.911. The patient has   had a good week. She has had no problems, no changes in her   medications, allergies, or review of systems. OBJECTIVE:  Her temperature is 98.2, pulse 65, respirations 20, blood   pressure 131/72. Wound #1 which is the wound of the left lateral anterior lower leg is 3.5   x 1.5 x 0.1 cm which is 19% smaller in terms of area and 59% smaller   in terms of depth. This wound is covered by devitalized tissue and   slough, and it is recommended that this wound be debrided. Therefore, topical Xylocaine 4% was applied for 10 minutes. With the   use of a 5 mm ring curette, the surface of the wound was debrided to   get down to underlying healthy granulation tissue. Hemostasis was   achieved with gentle pressure until dry. Wound #4 which is the right distal lateral lower leg is much smaller at   0.4 x 0.4 x 0.1 cm. This is 99.3% smaller in terms of surface area and   wound depth. ASSESSMENT AND PLAN:  For the bilateral venous leg ulcers, I have   again spoken to the patient about the necessity of keeping her legs   elevated. She admits that she never does that. We are going to   continue the same dressing which is Endoform, Hydrofera Blue, and 3-  layer wraps bilaterally. Hopefully, she will keep her legs elevated more   during the coming week. She will do gastrocnemius muscle exercise to   improve venous outflow, and I will see her again in followup in 1 week. All questions were answered. Her condition on discharge is stable.           MD DALLAS Barboza / NB   D:  2017 12:22   T:  07/14/2017   09:54   Job #:  [id number]

## 2017-07-20 ENCOUNTER — HOSPITAL ENCOUNTER (OUTPATIENT)
Dept: WOUND CARE | Age: 82
Discharge: HOME | End: 2017-07-20
Payer: MEDICARE

## 2017-07-20 PROCEDURE — 29581 APPL MULTLAYER CMPRN SYS LEG: CPT

## 2017-07-23 NOTE — PROGRESS NOTES
1500 [facility] [place] 12, [address]   WOUND CARE PROGRESS NOTE       Name:  Ama Orellana   MR#:  [id number]   :  10/05/1931   Account #:  [de-identified]        Date of Adm:  2017       DATE OF SERVICE: 2017    SUBJECTIVE: The patient returns for her bilateral venous leg ulcers,   I87.313, L97.911. She has had a good week. She has had no   Problems,  nor changes in her medications, allergies or review of   systems. OBJECTIVE   VITAL SIGNS:  Her temperature is 98, pulse 59, respirations 16, blood   pressure 99/61. The wound of the left lateral lower leg is smaller, 2 x 1.3 x 0.1 cm. The   wound of the right distal lateral lower leg is larger but quite clean at 1 x   1 x 0.1 cm. The right leg is smaller in circumference than the left and she   tends to keep the left leg in a more dependent position. ASSESSMENT AND PLAN:  For venous leg ulcers we are going to   continue Endoform, Hydrofera blue 3-layer wraps. The patient will try   to keep her legs elevated but she admits to having no memory recall  throughout the week. We are going to reorder the [address] for her   and I will see her again in followup in 1 week. All questions were   answered. Her condition on discharge is stable.           MD DALLAS Gibson / NATO   D:  2017   11:39   T:  2017   15:46   Job #:  [id number]

## 2017-07-27 ENCOUNTER — HOSPITAL ENCOUNTER (OUTPATIENT)
Dept: WOUND CARE | Age: 82
Discharge: HOME | End: 2017-07-27
Payer: MEDICARE

## 2017-07-27 PROCEDURE — 97597 DBRDMT OPN WND 1ST 20 CM/<: CPT

## 2017-07-28 NOTE — PROGRESS NOTES
1500 [place] [facility] Du [place] [address]   WOUND CARE PROGRESS NOTE       Name:  Shaila Monteiro   MR#:  [id number]   :  10/05/1931   Account #:  [de-identified]        Date of Adm:  2017       DATE OF SERVICE: 2017. SUBJECTIVE: The patient returned for treatment of her bilateral   venous leg ulcers, (I87.313) which only involves skin breakdown,   L97.911). Upon presentation, she has had a good week. She denies   any problems or changes in her medications, allergies, or review of   systems. OBJECTIVE: Her temperature is 98.6, pulse 60, respirations 16, blood   pressure 131/61. Wound #1, which is the wound of the left lateral   anterior lower leg that measures 2 x 1.7 x 0.1 cm. It is covered with a   layer of slough and is in need of debridement. The wound of the right   distal lower leg has healed, but unfortunately, she has a new wound,   #8 of the right lateral lower leg, which was a small tear, which   measures 0.2 x 0.5 x 0.1 cm. This wound is clean, not in need of   debridement and will be treated with Xeroform and a 3-layer wrap. I recommend that wound #1, be debrided. I explained the risks and   benefits to the patient and her daughter. They understand and wished to   proceed. Therefore, topical Xylocaine 4% was applied for 15   minutes. With the use of a 5 mm ring curette, the entire wound was   debrided to get down to underlying healthy bleeding granulation tissue. Hemostasis was achieved with gentle pressure until dry. ASSESSMENT AND PLAN: For the left venous leg ulcer, we are going   to continue Endoform, Hydrofera Blue and a 3-layer wrap. I reordered   the CircAids last week, hopefully they will arrive and she will be able to   bring them to next week's visit. We will use Xeroform and a 3-layer   wrap on the right lower extremity. All questions were answered. Her   condition on discharge is stable.         MD DALLAS Barboza / Michelle Elliott   D: 07/27/2017   12:33   T:  07/28/2017   08:47   Job #:  [id number]

## 2017-08-10 ENCOUNTER — HOSPITAL ENCOUNTER (OUTPATIENT)
Dept: WOUND CARE | Age: 82
Discharge: HOME | End: 2017-08-10
Payer: MEDICARE

## 2017-08-10 PROCEDURE — 97597 DBRDMT OPN WND 1ST 20 CM/<: CPT

## 2017-08-10 NOTE — PROGRESS NOTES
1500 [place] [facility] Du [place] 12 [address] [facility]   WOUND CARE PROGRESS NOTE       Name:  Bam Pittman   MR#:  [id number]   :  10/05/1931   Account #:  [de-identified]        Date of Adm:  08/10/2017       DATE OF SERVICE: 08/10/2017. SUBJECTIVE: The patient returns for the first time in 2 weeks. She   missed last week's appointment because her daughters were out of   town. The 3-layer wraps were removed a week ago since they could not stay   in place for 2 weeks and she had Xeroform placed, followed by a 3-  layer wrap. She has had no problems since last seen, no changes in her   medications, allergies, or review of systems. OBJECTIVE: Her temperature is 97.5, pulse 56, respirations 18, blood   pressure 130/61. Examination of wound #1, which is the left lateral   anterior lower leg wound, is slightly larger 2 x 2.5 x 0.1 cm. It is   covered with a very thick, adherent and devitalized slough. Wound #8, which the wound of the right lateral lower leg, is unchanged   at 0.2 x 0.5 x 0.1 cm. This one is clean and not in need of debridement. It is noted that the wraps on the left lower leg were quite tight just distal   to the knee and quite tight just distal to the ankle. I have recommended that the wound be debrided. I explained the risks   and benefits. The patient and her daughter understand and wish to   proceed after topical Xylocaine was applied for 10 minutes. With the   use of a 5 mm ring curette, the entire wound was debrided to get down   to underlying healthy tissue. The wound dimension is unchanged, but   the depth is now 0.3 cm. Therefore, the wound dimensions today are 2   x 2.5 x 0.3 cm. Hemostasis was achieved with gentle pressure till dry. ASSESSMENT AND PLAN: We are going to continue the exact same   regimen which is Endoform, Hydrofera Blue and 3-layer wrap   bilaterally. The patient will return to the office in 1 week.  Again, she and her   daughter were instructed to bring her CircAids whenever they arrive. All questions were answered. CONDITION ON DISCHARGE: Stable.         MD DALLAS Armijo / RAFAL   D:  08/10/2017   11:42   T:  08/10/2017   13:20   Job #:  [id number]

## 2017-08-17 ENCOUNTER — HOSPITAL ENCOUNTER (OUTPATIENT)
Dept: WOUND CARE | Age: 82
Discharge: HOME | End: 2017-08-17
Payer: MEDICARE

## 2017-08-17 NOTE — PROGRESS NOTES
[address], [address] [place] [address]   WOUND CARE PROGRESS NOTE       Name:  Cirilo Murphy   MR#:  [id number]   :  10/05/1931   Account #:  [de-identified]        Date of Adm:  2017       DATE OF SERVICE: 2017     SUBJECTIVE: The patient presents for week #47 of treatment for   bilateral venous leg ulcers. Today, her CircAid devices were finally   brought to the office. She has had no problems over the past week, nor   changes in her medications, allergies or review of systems. OBJECTIVE: Her temperature is 96.5, pulse 60, respirations 18, blood   pressure 125/59. Examination showed the right 3-layer wrap slipped down. Wound #1,   the left lateral anterior lower leg wound, measures 2 x 2.6 x 0.2 cm. It   is covered with slough, and it is recommended that this be debrided. I   explained the risks and benefits. The patient and her daughter   understand and wish to proceed. Therefore, topical Xylocaine gel 2%   was applied for 15 minutes. With the use of a 5 mm ring curette, the   wound was completely debrided to get down to underlying healthy   bleeding granulation tissue. Hemostasis was achieved with gentle   pressure until dry. Wound #8, the right lateral lower leg, measures 0.2 x 0.4 x 0.1 cm. There are new skin tears in the more proximal portion of the leg, with   Edema, since the dressing had slipped. This leg is not in need of   debridement. ASSESSMENT AND PLAN: For the bilateral venous leg ulcers, we are   going to continue Endoform, followed by Hydrofera blue, followed by   the patient's new CircAid devices. Her daughter will be instructed on   how to apply them. At her nursing home, these CircAids can be reapplied as necessary if   they slip out of position. The dressing will be changed in 1 week, and I   will see her again in followup in 2 weeks. All questions were answered. The patient and her daughter   understand the plan.         Odilon Stewart. MD Carlos Enrique Decker / [facility]   D:  08/17/2017   11:28   T:  08/17/2017   14:09   Job #:  [id number]

## 2017-08-31 ENCOUNTER — HOSPITAL ENCOUNTER (OUTPATIENT)
Dept: WOUND CARE | Age: 82
Discharge: HOME | End: 2017-08-31
Payer: MEDICARE

## 2017-08-31 NOTE — PROGRESS NOTES
[address]   [place], [address]   WOUND CARE PROGRESS NOTE       Name:  Bam Pittman   MR#:  [id number]   :  10/05/1931   Account #:  [de-identified]        Date of Adm:  2017       DATE OF SERVICE: 2017    SUBJECTIVE: The patient returns for treatment of her bilateral venous   leg ulcers (I87.313, L97.911). She has been dressed with Endoform   followed by [facility] Blue and CircAid wraps. She presents today with neither leg being dressed with the proper   cotton stocking layer. Wound dressings have slipped off and they are now   down around her ankles. Both CircAids are malodorous and soiled,   and the left one is compressed right above the ankle, so that there is   great constriction of the ankle. The proximal leg is engorged and   weeping and erythematous. PHYSICAL EXAM   VITAL SIGNS: Her temperature is 97.8, pulse 62, respirations 20,   blood pressure 111/79. I had the patient's daughter come in the room so she could see that   this dressing and the wraps have not been appropriately applied, and   why we need to address this. Wound #1, which is the wound of the left lateral anterior lower leg, last   time was 2 x 2.6 x 0.2 cm. Today, it is larger at 5.5 x 4 x 0.1 cm. Wound #8, which is of the right lateral lower leg, is small at 0.2 x 0.2 x   0.1 cm, and this is the one where the CircAid was appropriately   applied. ASSESSMENT/PLAN: We are going to apply Endoform followed by   [facility] and a 3-layer wrap bilaterally today. The patient will   keep her legs elevated. The CircAids will return to her nursing home,   where they will be washed and dried by air drying. The family will bring   her CircAids next time, including the cotton layer. At that time, we are   going to apply the CircAids. They will be removed every evening. They   will be washed and air dried and reapplied in the mornings. The staff will   wash her feet.  They will apply moisturizing cream and hopefully we can get   her on a program where she can be well managed. All questions are answered. Her daughter understands the plan. Her   condition on discharge is stable.         MD DALLAS Bartlett / MONIKA   D:  08/31/2017   11:50   T:  08/31/2017   12:28   Job #:  [id number]

## 2017-09-07 ENCOUNTER — HOSPITAL ENCOUNTER (OUTPATIENT)
Dept: WOUND CARE | Age: 82
Discharge: HOME | End: 2017-09-07
Payer: MEDICARE

## 2017-09-07 PROCEDURE — 97597 DBRDMT OPN WND 1ST 20 CM/<: CPT

## 2017-09-07 NOTE — PROGRESS NOTES
[address] [facility] [place] 12 [address]   WOUND CARE PROGRESS NOTE       Name:  Chancy Fothergill   MR#:  [id number]   :  10/05/1931   Account #:  [de-identified]        Date of Adm:  2017       DATE OF SERVICE: 2017     SUBJECTIVE: The patient returns for treatment of her bilateral venous   leg ulcers (I87.313, L97.911). There have been no changes in her   medications, allergies or review of systems since last seen. OBJECTIVE: Her temperature is 97.3, pulse 56, respirations 16, blood   pressure 144/75. At last visit, on 2017, wound #1, the left lateral anterior lower leg   wound, measured 5.5 x 4 x 0.1 cm. Today, it is 4.6 x 3.8 x 0.1 cm, and   is macerated, moist and oozing. Wound #8 of the right lateral lower leg was 0.2 x 0.2 x 0.1, and today it   is 1 x 0.8 x 0.1 cm, and also has an area of adherent slough. I have recommended that the wounds be debrided. I explained the   risks and benefits. The patient and her daughter understand and wish   to proceed. Therefore, topical Xylocaine was applied for 10 minutes. With the use of a 5 mm ring curette, the wounds were debrided down   to underlying, healthy, bleeding granulation tissue. Hemostasis was   achieved with gentle pressure until dry. ASSESSMENT AND PLAN: Because the legs are so moist, we are   going to use Aquacel AG today and try to apply 4-layer wraps. I have   spoken to the patient's daughter at length about the necessity of   keeping her legs elevated. I feel that her daughter should speak to all   the nurses and the nursing administration at [facility] to explain   that they must keep her legs elevated at all times. The patient will   return next week with her CircAids. At that point, maybe we can switch   her over to the CircAid device, but it will all be dependent upon how the   legs and the wounds are looking. All questions were answered. Her condition on discharge is stable. Denver Copping, MD      [place] / [facility] MINDY BLAS   D:  09/07/2017   12:02   T:  09/07/2017   13:15   Job #:  [id number]

## 2017-09-14 ENCOUNTER — HOSPITAL ENCOUNTER (OUTPATIENT)
Dept: WOUND CARE | Age: 82
Discharge: HOME | End: 2017-09-14
Payer: MEDICARE

## 2017-09-14 PROCEDURE — 11045 DBRDMT SUBQ TISS EACH ADDL: CPT

## 2017-09-14 PROCEDURE — 97597 DBRDMT OPN WND 1ST 20 CM/<: CPT

## 2017-09-14 RX ORDER — LIDOCAINE HYDROCHLORIDE 40 MG/ML
SOLUTION TOPICAL
Status: COMPLETED | OUTPATIENT
Start: 2017-09-14 | End: 2017-09-14

## 2017-09-14 RX ADMIN — LIDOCAINE HYDROCHLORIDE: 40 SOLUTION TOPICAL at 11:36

## 2017-09-14 NOTE — PROGRESS NOTES
1500 [place] [facility] Du [place] 12, [address]   WOUND CARE PROGRESS NOTE       Name:  Romero Flynn   MR#:  [id number]   :  10/05/1931   Account #:  [de-identified]        Date of Adm:  2017       DATE OF SERVICE: 2017    SUBJECTIVE: The patient returns for treatment of her bilateral venous   leg ulcers (I87.313) which only involves skin breakdown (L97.911). When she was seen last week, we applied 4-layer wraps. When she   presented today, she did not have the [place] layer in place. Both legs   are severely swollen and more so on the left than on the right, but the   wounds are greater in size, especially on the right. OBJECTIVE: Her temperature is 98.1, pulse 62, respirations 12, blood   pressure 114/56. Wound #1 of the left lateral lower leg is larger at 6 x 5   x 0.1 cm. Wound #8 of the right lateral lower leg is 12 x 16 x 0.5   cm. The areas are soft, boggy soft tissue. It is recommended the   wounds be debrided. I explained the risks and benefits. The patient and her daughter   understands and wished to proceed. Therefore, topical Xylocaine 4%   was applied for 10 minutes. With the use of a 5 mm ring curette, the   wounds were debrided. Nice healthy bleeding tissue was encountered. There was 1 significant bleeder on wound #8 which required silver   nitrate application, and then silver nitrate was lightly applied to the   other areas of the wounds to try to create involution of the soft, boggy   tissue of the wounds. They were then irrigated with sterile saline. ASSESSMENT AND PLAN:  The patient is not getting optimal care at   her nursing facility. Her legs are never elevated. Her wraps were   changed this week without proper application. Therefore, we are going   to apply Aquacel AG to the wounds, followed by Exu-Dry and light   gauze, followed by CircAids. These will be changed daily by Home   Health who will visit the nursing home.  I would like to get these legs to   settle down and for the edema to go down, and she obviously   desperately needs elevation and proper compression and she has had  none of that. CONDITION ON DISCHARGE: Stable.          MD DALLAS Chery / RAFAL   D:  09/14/2017   12:07   T:  09/14/2017   15:10   Job #:  [id number]

## 2017-09-21 ENCOUNTER — HOSPITAL ENCOUNTER (OUTPATIENT)
Dept: WOUND CARE | Age: 82
Discharge: HOME | End: 2017-09-21
Payer: MEDICARE

## 2017-09-21 PROCEDURE — 99214 OFFICE O/P EST MOD 30 MIN: CPT

## 2017-09-21 PROCEDURE — 74011000250 HC RX REV CODE- 250: Performed by: OTOLARYNGOLOGY

## 2017-09-21 RX ORDER — LIDOCAINE HYDROCHLORIDE 20 MG/ML
JELLY TOPICAL
Status: COMPLETED | OUTPATIENT
Start: 2017-09-21 | End: 2017-09-21

## 2017-09-21 RX ADMIN — LIDOCAINE HYDROCHLORIDE 5 ML: 20 JELLY TOPICAL at 12:10

## 2017-09-21 NOTE — PROGRESS NOTES
1500 [place] [facility] 12 [address]   WOUND CARE PROGRESS NOTE       Name:  Alonzo Rosario   MR#:  [id number]   :  10/05/1931   Account #:  [de-identified]        Date of Adm:  2017       DATE OF SERVICE: 2017     SUBJECTIVE: The patient returns for treatment of her bilateral venous   leg ulcers. Last week we switched her to Aquacel AG, EXU-DRY, light   gauze, and her Circ Aid wraps to be applied every day and dressing   changes to be done daily to make sure that she had appropriate care   with her application of her Circ Aids. There have been no problems last   seen, no changes in her medications, allergies or review of systems. OBJECTIVE   VITAL SIGNS: Her temperature is 97.7, pulse 56, respirations 16,   blood pressure 117/53. Wound #1, which is the left anterior lower leg measures 9 x 8.5 x 0.2   cm. The edema is significantly improved over the anterior tibia. Wound   #8, which is right lateral lower leg measures 13 x 13.5 x 0.1 cm. These   wounds are also improved and . All the wounds seem to be   hypertrophic  with hypergranulation tissue, and therefore we are going   to change the dressings today. ASSESSMENT AND PLAN: We are going to switch to Mepilex AG   foam, followed by EXU-DRY, followed by Circ Aids. We will continue   the daily dressing changes and removal and cleaning of her Circ Aids. The patient will return to see us in one week. Her condition on   discharge is stable.          MD DALLAS Carmen / Sloan Davis   D:  2017   12:41   T:  2017   13:52   Job #:  [id number]

## 2017-10-05 ENCOUNTER — HOSPITAL ENCOUNTER (OUTPATIENT)
Dept: WOUND CARE | Age: 82
Discharge: HOME | End: 2017-10-05
Payer: MEDICARE

## 2017-10-05 PROCEDURE — 97598 DBRDMT OPN WND ADDL 20CM/<: CPT

## 2017-10-05 PROCEDURE — 97597 DBRDMT OPN WND 1ST 20 CM/<: CPT

## 2017-10-05 NOTE — PROGRESS NOTES
[address] [facility] [place] [address]   WOUND CARE PROGRESS NOTE       Name:  Keith Jiménez   MR#:  [id number]   :  10/05/1931   Account #:  [de-identified]        Date of Adm:  10/05/2017       DATE OF SERVICE: 10/05/2017    SUBJECTIVE: The patient returns for her bilateral venous leg ulcers   (I87.313), which only involves skin breakdown (L97.911). The patient   was last seen on . She missed last week's appointment. She has   been using Mepilex AG foam and Exu-Dry as well as her CircAid   devices. She has been instructed every week on leg elevation and   gastrocnemius muscle exercises. There have been no changes in her   medications, allergies or review of systems since last seen. OBJECTIVE: Her temperature today is 98.4, pulse 60, respirations 16,   blood pressure 125/56. Examination shows she has very edematous   feet bilaterally since she is not using the foot component of her   CircAids. Her legs themselves are much improved with resolution of   the edema over her tibias. She has dry, scaly skin and persistent   wounds. Wound #1, which is the wound of the left anterior lower leg is   smaller at 8 x 8 x 0.1 cm and it is filled with devitalized tissue. Wound   #8, which is a wound to right lateral lower leg is much improved at 4.1   x 0.1 cm. It is also filled with devitalized tissue and slough. I recommend that the wounds be debrided. I explained the risks and   benefits. The patient understands and wishes to proceed. Therefore,   topical Xylocaine 2% gel was applied for 10 minutes. With the use of a   5 mm ring curettes, the wounds were debrided down to underlying   healthy bleeding tissue. Hemostasis was achieved with gentle   pressure until dry. ASSESSMENT AND PLAN: Since the patient is doing well, we are   going to continue Mepilex AG foam and Exu-Dry as well as   CircAids.  We are going to have these nursing visits every other day to   change the dressings and the CircAids. In addition, we are going to   have them apply a moisturizing cream every day or every other day for   the dry, scaly skin of her lower extremities. I have asked her to keep   her legs elevated as much as possible to minimize the edema to   facilitate healing. I am going to see her again in follow up in 2 weeks. All questions were answered. Her condition on discharge is stable.          MD DALLAS Garcia / Sita.Nissen   D:  10/05/2017   11:47   T:  10/05/2017   13:10   Job #:  [id number]

## 2017-10-26 ENCOUNTER — HOSPITAL ENCOUNTER (OUTPATIENT)
Dept: WOUND CARE | Age: 82
Discharge: HOME | End: 2017-10-26
Payer: MEDICARE

## 2017-10-26 PROCEDURE — 74011000250 HC RX REV CODE- 250: Performed by: OTOLARYNGOLOGY

## 2017-10-26 PROCEDURE — 97597 DBRDMT OPN WND 1ST 20 CM/<: CPT

## 2017-10-26 RX ORDER — LIDOCAINE HYDROCHLORIDE 20 MG/ML
JELLY TOPICAL
Status: COMPLETED | OUTPATIENT
Start: 2017-10-26 | End: 2017-10-26

## 2017-10-26 RX ADMIN — LIDOCAINE HYDROCHLORIDE 5 ML: 20 JELLY TOPICAL at 12:00

## 2017-10-26 NOTE — PROGRESS NOTES
1500 [place] [facility] [place] 12, [address]   WOUND CARE PROGRESS NOTE       Name:  Bear Chiang   MR#:  [id number]   :  10/05/1931   Account #:  [de-identified]        Date of Adm:  10/26/2017       DATE OF SERVICE: 10/26/2017. The patient returns for her bilateral venous leg ulcers, I87.313, which   only involves skin breakdown, L97.911, L97.921. The patient was last seen 3 weeks ago. She has had no problems   since last seen nor changes in her medications, allergies, or review of   systems. Her temperature today is 97.4, pulse 61, respirations 20,   blood pressure 121/72. Examination shows excellent management of   the edema over her tibia. Both feet remain edematous. She continues   to have bilateral wounds but interestingly neither one is in the location   of the original wound. Wound #1 of the left anterior lower leg measures   2 x 1.6 x 0.1 cm and is hypertrophic tissue. Wound #8 which is a medial   right lower leg where the previous was a right lateral lower leg   measures 0.5 x 7 x 0.1 cm which is a longitudinal wound obviously due   to a wrinkle in her CircAid. It is recommended that wound #8, which is filled with slough, be   debrided and cauterized and wound #1 be cauterized for the   hypergranulation tissue. I explained the risks and benefits. The patient   and her daughter understand and wish to proceed. Therefore, topical   Xylocaine 2% was applied for 15 minutes. With use of a 5 mm ring   curette, the wound of the right lower extremity was lightly cauterized to   get down to underlying healthy bleeding tissue. Hemostasis was   achieved with gentle pressure and kept dry. The wound was then   cauterized in its entirety. The wound of the left leg was also cauterized   to bring the hypergranulation tissue down to the flat periwound. Both   wounds were then irrigated with sterile saline.      ASSESSMENT AND PLAN: Since the patient is doing well on the   current regimen, we are going to continue Mepilex AG foam, Exu-Dry,   and CircAid wraps. They will be changed every other day. Moisturizing   cream will be applied at those changes. She can shower and bathe   and use the CircAids after each shower. All questions were answered. Her condition on discharge is stable.          MD Carlos Urena / Carol Santoro   D:  10/26/2017   12:11   T:  10/26/2017   13:33   Job #:  [id number]

## 2017-11-16 ENCOUNTER — HOSPITAL ENCOUNTER (OUTPATIENT)
Dept: WOUND CARE | Age: 82
Discharge: HOME | End: 2017-11-16
Payer: MEDICARE

## 2017-11-16 PROCEDURE — 29581 APPL MULTLAYER CMPRN SYS LEG: CPT

## 2017-11-16 RX ORDER — LIDOCAINE HYDROCHLORIDE 20 MG/ML
JELLY TOPICAL
Status: COMPLETED | OUTPATIENT
Start: 2017-11-16 | End: 2017-11-16

## 2017-11-16 RX ADMIN — LIDOCAINE HYDROCHLORIDE 5 ML: 20 JELLY TOPICAL at 12:00

## 2017-11-16 NOTE — PROGRESS NOTES
[address] [facility] [place] [address] [facility]   WOUND CARE PROGRESS NOTE       Name:  Tab Stephenson   MR#:  [id number]   :  10/05/1931   Account #:  [de-identified]        Date of Adm:  2017       DATE OF SERVICE: 2017    SUBJECTIVE: Today, I spoke to the occupational therapist from   [facility] who explained the dilemma of trying to get the   patient a wheelchair. Due to her size, she needs a bariatric chair. She   also needs one that tilts back because her head falls posteriorly when   she falls asleep in the chair all of the time, and she needs one with leg   extensions so that she can keep her legs elevated. A conventional size   chair would fall back if somebody of her size were to sit in it. Therefore,   this has not been authorized with 2 different vendors and the only the   way that this could be obtained would be if the family purchase one   roughly at the cost of $2000. The patient continues to live in assisted living. We had been   utilizing Mepilex AG foam, Exu-Dry and CircAids. This had been changed   every other day and she has had moisturizing cream applied every   other day. She was instructed to keep her legs elevated. She presents now and it is noted that neither CircAid has been applied   properly. Both are quite loose and are slipping and they are not   performing compression. OBJECTIVE: Her temperature is 97.8, pulse 57, respirations 18, blood   pressure 149/70. Wound #1 of the left anterior lower leg is larger at 3.5 x 2.4 x 0.1 cm. Wound #8 of the right medial lower leg is larger at 0.7 x 6 x 0.1 cm,   and new wound #9 of the right anterior lower leg, is where the top of   the CircAid would be, is 3 x 6 x 0.1 cm. All wounds are covered with moist slough and they were just gently   debrided with a moistened 4 x 4 gauze. ASSESSMENT AND PLAN: I have spoken to the patient and her   daughter.  I explained that she might require being in the nursing care   part of [facility] rather than the assisting living center since she   needs to keep her legs elevated and she is unable to remember to do   that. I am asking the daughter to take the CircAids home to wash them   since they are soiled. They will be air dried and she will return them   with the patient in 6 days when she has her dressings and wraps   changed and removed and maybe we can convert her over to   the CircAids again at that time. We are going to apply Debbie AG to all   wounds. We are going to apply 3-layer wraps bilaterally since she has   pitting edema and new wounds from the lack of adequate compression   due to inadequate application of her CircAids. The patient's daughter understands the plan. All questions were   answered. She will return to us in 6 days since we are closed in 7 days   for the Thanksgiving Holiday, and I will see her the following week. CONDITION ON DISCHARGE: Stable.          MD DALLAS Wolfe / RAFAL   D:  11/16/2017   12:09   T:  11/16/2017   16:56   Job #:  [id number]

## 2017-11-22 ENCOUNTER — HOSPITAL ENCOUNTER (OUTPATIENT)
Dept: WOUND CARE | Age: 82
Discharge: HOME | End: 2017-11-22
Payer: MEDICARE

## 2017-11-22 PROCEDURE — 29581 APPL MULTLAYER CMPRN SYS LEG: CPT

## 2017-11-22 NOTE — PROGRESS NOTES
[facility] [place], [address] [place] [address]   WOUND CARE PROGRESS NOTE       Name:  Terri Greene   MR#:  [id number]   :  10/05/1931   Account #:  [de-identified]        Date of Adm:  2017       DATE OF VISIT: 2017    REASON FOR VISIT: Bilateral lower extremity venous ulcers. HISTORY: The patient is an 80-year-old female who is in her 61st   week of treatment here at the wound clinic. She is nonambulatory and   sits in a wheelchair all day at her nursing facility. Previously, wound   care was being done by home health using a compression device. Last   week, she was switched to a 3-layer wrap. PHYSICAL EXAMINATION: The right anterior lower leg wound is 2.5 x   5 cm. The left anterior lower leg wound is 4 x 3 cm. Both are clean and   granulating. IMPRESSION: Improved appearance of leg wounds with a 3-layer   wrap over the last week. This will be replaced with Debbie over the   wound. I do not think it is realistic that her wounds will heal if she sits in   a wheelchair all day, regardless of whether the legs are elevated or not   in the wheelchair. This was emphasized to the patient's daughter. They   will return in 1 week for followup.         MD Salma Rivera / Anjelica   D:  2017   11:03   T:  2017   16:34   Job #:  [id number]

## 2017-11-30 ENCOUNTER — HOSPITAL ENCOUNTER (OUTPATIENT)
Dept: WOUND CARE | Age: 82
Discharge: HOME | End: 2017-11-30
Payer: MEDICARE

## 2017-11-30 PROCEDURE — 97597 DBRDMT OPN WND 1ST 20 CM/<: CPT

## 2017-11-30 RX ORDER — LIDOCAINE HYDROCHLORIDE 20 MG/ML
JELLY TOPICAL
Status: COMPLETED | OUTPATIENT
Start: 2017-11-30 | End: 2017-11-30

## 2017-11-30 RX ADMIN — LIDOCAINE HYDROCHLORIDE 5 ML: 20 JELLY TOPICAL at 10:00

## 2017-11-30 NOTE — PROGRESS NOTES
1500 [place] [facility] [place] [address]   WOUND CARE PROGRESS NOTE       Name:  Vanda Moore   MR#:  [id number]   :  10/05/1931   Account #:  [de-identified]        Date of Adm:  2017       DATE OF SERVICE: 2017     SUBJECTIVE: The patient returns for her bilateral venous leg ulcers   (I87.313, L97.911, L97.921). The patient was seen in this office by Dr. Albert Petty on 2017. He   continued the previous dressings, which are Debbie and Hydrofera   Blue. She has had no problems since last seen, nor changes in her   medications, allergies, or review of systems. OBJECTIVE: Her temperature is 97.5, pulse 58, respirations 16, blood   pressure 135/68. Wound #1, which has a wound of the left anterior lower leg, is larger at   4 x 4.2 x 0.1 cm. It is covered with slough and has a fibrotic base. Wound #8, which is the wound to the right medial lower leg, is longer   at 0.5 x 6.1 x 0.1 cm, and wound #9, which is the wound to the right   anterior lower leg, is also larger at 4 x 5.5 x 0.1 cm. It was recommended that all wounds debrided. I explained the risks   and benefits to the patient and her daughter. They understand and   wished to proceed. Therefore, topical Xylocaine was applied for 15   minutes. With the use of a 5 mm ring curette, the wounds were   debrided down to underlying healthy bleeding tissue. On the left leg,   it was down to underlying thick, fibrotic tissue. Hemostasis was   achieved with pressure until dry. ASSESSMENT AND PLAN: The patient's legs were very edematous,   which showed that she has been keeping them in a dependent   position. We are going to switch to Aquacel AG, followed by 3-layer   wraps. I explained to the patient's daughter that we might not ever get   these wounds healed because she is unable to keep her legs elevated.    She is not in a nursing unit where shewould have people helping   her all the time and we are going to do all we can, but I am very   concerned that if we stopped seeing her and just were to see her on a p.r.n.   basis, she will develop cellulitis. Therefore, she will return in one week. All questions were answered. Her condition on discharge is stable.         MD DALLAS Chacon / RAFAL   D:  11/30/2017   10:23   T:  11/30/2017   13:16   Job #:  [id number]

## 2017-12-07 ENCOUNTER — HOSPITAL ENCOUNTER (OUTPATIENT)
Dept: WOUND CARE | Age: 82
Discharge: HOME | End: 2017-12-07
Payer: MEDICARE

## 2017-12-07 PROCEDURE — 97597 DBRDMT OPN WND 1ST 20 CM/<: CPT

## 2017-12-07 PROCEDURE — 97598 DBRDMT OPN WND ADDL 20CM/<: CPT

## 2017-12-07 RX ORDER — LIDOCAINE HYDROCHLORIDE 20 MG/ML
JELLY TOPICAL
Status: COMPLETED | OUTPATIENT
Start: 2017-12-07 | End: 2017-12-07

## 2017-12-07 RX ADMIN — LIDOCAINE HYDROCHLORIDE 5 ML: 20 JELLY TOPICAL at 12:00

## 2017-12-08 NOTE — PROGRESS NOTES
[address] [place] [address]   WOUND CARE PROGRESS NOTE       Name:  Jose Guadalupe Quiroz   MR#:  [id number]   :  10/05/1931   Account #:  [de-identified]        Date of Adm:  2017       DATE OF SERVICE: 2017    SUBJECTIVE: The patient is seen in followup for significant venous   stasis disease bilaterally. Her dementia limits any history about   intercurrent complaints or changes in her medications. Vital signs include a temperature of 97.8, pulse is 57 and regular,   respiratory rate is 16, blood pressure 115/60. She is obese, but she   seems comfortable sitting in her wheelchair. Examination of the lower   extremities shows that basically there are 3 wounds of which we have   some concern. One is on the left anterior leg. It is 5.8 x 5.9 x 0.1, quite   flat with a red granular base and did not require anything except for   cleaning with gauze. The right medial lower leg is about 0.9 x 4 (it   wraps around behind the leg) and is only 0.1 deep. That was likewise   cleaned with a roughened gauze. The right anterior lower leg is a   rather more generally open area with several areas that are   denuded. None is full thickness, but the whole area involved is 5.9 x   9.2 cm. The plan the last time through had been to change all of these   so that they were covered with Aquacel. I am not seeing any evidence   of significant maceration of the intact skin. I suppose we will continue   with that and gentle compression to see if we can get any significant   response. The patient tolerated our ministrations today without any   Difficulty, and we will plan to see her back in just about a week so we   can decide if any changes are indicated at that time.         MD ROBERT Miller / BEE   D:  2017   17:19   T:  2017   06:06   Job #:  [id number]

## 2017-12-13 ENCOUNTER — HOSPITAL ENCOUNTER (OUTPATIENT)
Dept: WOUND CARE | Age: 82
Discharge: HOME | End: 2017-12-13
Payer: MEDICARE

## 2017-12-13 PROCEDURE — 29581 APPL MULTLAYER CMPRN SYS LEG: CPT

## 2017-12-13 RX ORDER — LIDOCAINE HYDROCHLORIDE 20 MG/ML
JELLY TOPICAL
Status: COMPLETED | OUTPATIENT
Start: 2017-12-13 | End: 2017-12-13

## 2017-12-13 RX ADMIN — LIDOCAINE HYDROCHLORIDE 5 ML: 20 JELLY TOPICAL at 12:00

## 2017-12-13 NOTE — PROGRESS NOTES
[facility] [address]  WOUND CARE PROGRESS NOTE    Roderick Gusman  MR#: [id number]  : 10/05/1931  ACCOUNT #: [de-identified]   DATE OF SERVICE: 2017    REASON FOR VISIT: Bilateral lower extremity venous ulcers. HISTORY: The patient is a 69-year-old female with chronic lower leg venous ulcers related to poorly swelling. She is getting 3 layer wraps on a weekly basis. PHYSICAL EXAMINATION:  The right anterior lower leg wound measures 5.5 x 7 cm; the left anterior lower leg wound is 4 x 3 cm. Both are clean. She has significant leg swelling. IMPRESSION: Persistent bilateral leg ulcers. PLAN: We will continue with 3 layer wrap and Aquacel Ag. She will return in 1 week for followup.       MD FRIDA Jensen / BRITTANY  D: 2017 11:15     T: 2017 11:37  JOB #: [id number]

## 2017-12-21 ENCOUNTER — HOSPITAL ENCOUNTER (OUTPATIENT)
Dept: WOUND CARE | Age: 82
Discharge: HOME | End: 2017-12-21
Payer: MEDICARE

## 2017-12-21 PROCEDURE — 99214 OFFICE O/P EST MOD 30 MIN: CPT

## 2017-12-21 NOTE — PROGRESS NOTES
[facility] CARE PROGRESS NOTE    Kimberly Apodaca  MR#: [id number]  : 10/05/1931  ACCOUNT #: [de-identified]   DATE OF SERVICE: 2017    The patient returns for her bilateral venous leg ulcers, I87.313, L97.911, L97.921. It is reported that the patient has a new sacral ulcer which is being treated by home health. The patient was instructed to come today on a stretcher so we can examine her sacrum, since we are unable to lift her and her substantial weight with our [employer] lift. Unfortunately, the patient presents today in a wheelchair. PHYSICAL EXAMINATION:  VITAL SIGNS:  Temperature is 97.4, pulse 57, respirations 16, blood pressure 136/78. EXTREMITIES:  The patient has bilateral 3-layer wraps. They are both down to about mid calf and are not at all in the appropriate position. SKIN: The wound to the right medial leg measures 1 x 4 x 0.1 cm. The wound to the right anterior leg is 7 x 11 x 0.1 cm and is beefy red and friable and the wound to the left leg is 1.6 x 2 cm. ASSESSMENT/PLAN: I had a long talk with the patient's daughter. I explained that we are doing everything appropriately and ordering everything appropriately, but every time the patient comes in, her wraps have fallen down and they are acting as tourniquets rather than as compression wraps. In addition, her legs are not being elevated like they need to be. Therefore, I have recommended we put the patient on bed rest only. She can sit upright for meals. I am ordering a gel overlay for her mattress. She will be turned every hour to try to prevent pressure over her sacrum. We are simply going to apply Aquacel Ag to the wounds of both legs. We are not going to apply any wraps or CircAids or any other form or wrapping at this time since that has not been well managed where she is living.   I am going to see her again in followup in one week and she must come with a stretcher so we can evaluate her sacrum to see if that is something that also is in need of treatment. I explained that we are very frustrated that we cannot heal her in spite of all the work that we are doing and all the orders that we are presenting, so we are simply going to try elevation with bed rest and no compression at this time. The patient's daughter understands the plan. All questions were answered. CONDITION ON DISCHARGE:  Stable.       MD MELINDA Starr / ANDREIA  D: 12/21/2017 12:03     T: 12/21/2017 12:34  JOB #: [id number]

## 2017-12-28 ENCOUNTER — HOSPITAL ENCOUNTER (OUTPATIENT)
Dept: WOUND CARE | Age: 82
Discharge: HOME | End: 2017-12-28
Payer: MEDICARE

## 2017-12-28 PROCEDURE — 97597 DBRDMT OPN WND 1ST 20 CM/<: CPT

## 2017-12-28 RX ORDER — LIDOCAINE HYDROCHLORIDE 20 MG/ML
JELLY TOPICAL
Status: COMPLETED | OUTPATIENT
Start: 2017-12-28 | End: 2017-12-28

## 2017-12-28 RX ADMIN — LIDOCAINE HYDROCHLORIDE: 20 JELLY TOPICAL at 12:00

## 2017-12-28 NOTE — PROGRESS NOTES
[facility] CARE PROGRESS NOTE    Jia Joseph  MR#: [id number]  : 10/05/1931  ACCOUNT #: [de-identified]   DATE OF SERVICE: 2017    SUBJECTIVE: The patient returns for her bilateral venous leg ulcers. I87.313, L97.911, L97.921. When seen last week, we placed her on bed rest, leg elevation, and Aquacel Ag and did not apply any wraps or compression since she was having difficulty with management of those compressive devices. She has had a good week. She has had no problems, no changes in her medications, allergies, or review of systems. We asked her to come in today on a stretcher, so we could examine her sacrum, but her facility felt that this was not necessary so she came in a wheelchair. OBJECTIVE:  Temperature is 97.4, pulse 62, respirations 18, blood pressure 111/67. Wound #1, which is the wound of the left anterior lower leg is larger, 2 x 3.5 x 0.1 cm. This leg is quite edematous, but nontender, without erythema and there is slough in the wound on the anterior lower leg. Wound #8 on the right medial lower leg is improved, 1 x 3 x 0.1 cm and wound #9 of the right anterior lower leg is improved to 7 x 8 x 0.1 cm. The wounds to the right anterior lower leg are quite bloody and friable and it is recommended all wounds be debrided. I explained the risks and benefits including bleeding. The patient and her daughter understand and wish to proceed. Therefore, topical Xylocaine 2% was applied for 10 minutes, which is of a 7 mm ring curette. All wounds were debrided. The wound on the left anterior lower leg is quite fibrotic and I was able to get down through this down to underlying healthy subcutaneous tissue. Hemostasis was achieved with pressure until dry.     ASSESSMENT AND PLAN:  I explained to the patient's daughter that we are going to continue leg elevation and bed rest.  I am going to hold off on the wraps for now because there was great difficulty in getting management of these devices at her facility. We are going to use Aquacel Ag every other day as well as a moisturizing cream every other day since she has very dry scaly skin of both legs. I will see her again in followup in 3 weeks to see if we are making progress with this conservative regimen. If not, we will then have to then go back to using some form of compression. All questions were answered. CONDITION ON DISCHARGE:  Stable.       MD MELINDA Webb / BRITTANY  D: 12/28/2017 12:19     T: 12/28/2017 12:58  JOB #: [id number]

## 2018-01-11 ENCOUNTER — HOSPITAL ENCOUNTER (OUTPATIENT)
Dept: WOUND CARE | Age: 83
Discharge: HOME | End: 2018-01-11
Payer: MEDICARE

## 2018-01-11 PROCEDURE — 97598 DBRDMT OPN WND ADDL 20CM/<: CPT

## 2018-01-11 PROCEDURE — 97597 DBRDMT OPN WND 1ST 20 CM/<: CPT

## 2018-01-11 NOTE — PROGRESS NOTES
[facility] CARE PROGRESS NOTE    Maxwell Mason  MR#: [id number]  : 10/05/1931  ACCOUNT #: [de-identified]   DATE OF SERVICE: 2018    The patient returns for her bilateral venous leg ulcers, I87.313, L97.911, L97.921. She was last seen on . Because of difficulty getting her to have a proper compression at her nursing home in the form of Tubigrips, 3-layer wraps or CircAids, we finally placed her on bed rest with leg elevation and Aquacel Ag every other day along with moisturizing cream to her lower extremities. She has had no problems since last seen nor changes in her medications, allergies or review of systems. Her temperature today is 98.1, pulse 58, respirations 18, blood pressure 125/79. Today is a good day for her in terms of her mentation and memory. She is alert and responsive. Wound #1, which is the wound of her left anterior lower leg, is smaller at 2 x 2.5 x 0.1 cm. It is still filled with fibrotic tissue. Wound #8, which is the wound to the right medial lower leg, is smaller at 1 x 2.5 x 0.1 cm and is filled with slough. Wound #9, which is the right anterior lower leg wound, is red, granular, with fibrotic elements and slough and is smaller at 6 x 7 x 0.1 cm. All wounds are in need of debridement. The periwound is intact. The edema is much improved, showing she has obviously been keeping her legs elevated much more than previously. I have recommended that the wounds be debrided. I explained the risks and benefits. The patient and her daughter understand and wish to proceed. Therefore, topical Xylocaine was applied for 10 minutes. With use of a 5 mm ring curette the wounds were debrided down through thick fibrotic tissue down to healthy bleeding granulation tissue. Hemostasis was achieved with gentle pressure until dry.     ASSESSMENT AND PLAN:  We are going to scrub the legs and the wounds with Hibiclens and then irrigate them clear with saline. We will then apply Aquacel Ag. This routine will be performed every other day at her nursing home. She will continue to keep her legs elevated as much as possible, and I will see her again in followup in 3 weeks. Her daughter and I are quite pleased with her progress. We understand that this is not conventional therapy, but she is doing well with it and we are going to continue doing this regimen for now. Her daughter understands that we might not be able to heal the leg ulcers, but as long as we can keep them controlled and manageable, she will be satisfied. All questions were answered. CONDITION ON DISCHARGE:  Stable.       MD MELINDA Sterling / Mariano Michaud  D: 01/11/2018 12:51     T: 01/11/2018 13:36  JOB #: [id number]

## 2018-02-01 ENCOUNTER — HOSPITAL ENCOUNTER (OUTPATIENT)
Dept: WOUND CARE | Age: 83
Discharge: HOME | End: 2018-02-01
Payer: MEDICARE

## 2018-02-01 PROCEDURE — 97598 DBRDMT OPN WND ADDL 20CM/<: CPT

## 2018-02-01 PROCEDURE — 97597 DBRDMT OPN WND 1ST 20 CM/<: CPT

## 2018-02-01 RX ORDER — LIDOCAINE HYDROCHLORIDE 20 MG/ML
JELLY TOPICAL
Status: COMPLETED | OUTPATIENT
Start: 2018-02-01 | End: 2018-02-01

## 2018-02-01 RX ADMIN — LIDOCAINE HYDROCHLORIDE: 20 JELLY TOPICAL at 12:00

## 2018-02-01 NOTE — PROGRESS NOTES
[facility] CARE PROGRESS NOTE    Yoselin Vazquez  MR#: [id number]  : 10/05/1931  ACCOUNT #: [de-identified]   DATE OF SERVICE: 2018    The patient returns for bilateral venous leg ulcers, I87.313, L97.911, L97.921. Since last seen, she developed a new wound of the right medial lower leg. She has had no other changes in her medications, allergies or review of systems. PHYSICAL EXAMINATION:    VITAL SIGNS:  Temperature is 97.9, pulse 58, respirations 16, blood pressure 115/68. SKIN:  Examination showed both legs to be severely edematous. Wound #1, which is the wound to the left anterior lower leg is 1.8 x 2.3 x 0.1 cm, which is somewhat smaller, but still fibrotic. Wound #10, which is the new wound to the right medial posterior lower leg is 3.5 x 1.2 x 0.1 cm, which is quite clean. Wound #8, which the wound of the right medial lower leg is 1.3 x 3.8 x 0.1 cm and filled with slough, and wound #9 which is the wound of the right anterior lower leg is 6 x 5.1 x 0.1 cm, which is smaller, but still covered with slough. It is recommended that the largest wounds be debrided. I explained the risks and benefits. The patient's daughter understands and wishes to proceed. Therefore, topical Xylocaine was applied for 10 minutes. With use of a 5 mm ring curette, the wounds were debrided down to underlying healthy bleeding granulation tissue. Hemostasis was achieved with gentle pressure until dry. An incidental ltender Stage I pressure ulcer (L89.611) was identified on the patient's right heel. ASSESSMENT/PLAN: We are going to wash the wounds with Hibiclens every two days and irrigate them with saline. We will then apply Aquacel Ag every other day to all wounds. The patient will keep her legs elevated and float her heels.   Since I am not going to ever get her healed with her keeping her legs in a dependent position, we are simply going to see her again whenever her daughter wishes for her to be seen. Everybody understands the plan. All questions were answered. CONDITION ON DISCHARGE:  Stable.       MD MELINDA Bhatia / ANDREIA  D: 02/01/2018 11:45     T: 02/01/2018 12:00  JOB #: [id number]

## 2018-03-01 ENCOUNTER — HOSPITAL ENCOUNTER (OUTPATIENT)
Dept: WOUND CARE | Age: 83
Discharge: HOME | End: 2018-03-01
Payer: MEDICARE

## 2018-03-01 PROCEDURE — 97597 DBRDMT OPN WND 1ST 20 CM/<: CPT

## 2018-03-01 RX ORDER — LIDOCAINE HYDROCHLORIDE 20 MG/ML
JELLY TOPICAL
Status: SHIPPED | OUTPATIENT
Start: 2018-03-01 | End: 2018-03-01

## 2018-03-01 NOTE — PROGRESS NOTES
[address]  WOUND CARE PROGRESS NOTE    Dennis Pressley  MR#: [id number]  : 10/05/1931  ACCOUNT #: [de-identified]   DATE OF SERVICE: 2018    The patient returns for her bilateral venous leg ulcers, I87.313, L97.911, L97.921, with lymphedema, I89.0, and a stage I pressure ulcer of the right heel, L89.611. She was last seen on . We had been scrubbing the wounds with Hibiclens followed [facility] other day. She was instructed to keep her legs elevated and to float her heels. She has had no problems since last seen, nor changes in her medications, allergies or review of systems. Her temperature today is 98.3, pulse 59, respirations 16, blood pressure 148/75. The first thing of note is that the circumferences of both legs are significantly improved, although she still has edema over the dorsum of both feet. Wound #1, which is the wound of left anterior lower leg, is a small fibrotic wound that is improved at 1.5 x 1.8 x 0.1 cm. The new wound, #10, of the right medial posterior lower leg is improved at 1 x 0.7 x 0.1 cm and is also quite fibrotic and covered with slough. Wound #8, which is the right medial lower leg wound, is improved to 1.1 x 2.5 x 0.1 cm and wound #9, which is wound of the right anterior lower leg, is improved at 1.8 x 3.2 x 0.1 cm. The small wound to the right heel is covered with a scab and this was gently removed and there is just a very small residual wound of 0.3 x 0.3 x 0.1 cm. It is recommended that the fibrotic venous leg ulcers be debrided of all slough. I explained the risks and benefits to the patient and her daughter. They understand and wish to proceed. Therefore, topical Xylocaine was applied for 10 minutes. We the use of a 5 mm ring curette the wounds were completely debrided down to underlying healthy bleeding granulation tissue. Hemostasis was achieved with firm pressure until dry.     ASSESSMENT AND PLAN:  The wounds were then scrubbed with Hibiclens, which was irrigated clear with saline. Then Aquacel AG was reapplied. This same regimen will be continued every other day. The patient will continue to keep her legs elevated. She will float her heels, have moisturizing cream applied every day or other day, and I will see her again in followup in 3 weeks. CONDITION ON DISCHARGE:  Stable and improved.       MD MELINDA Martinez / Marilyn Fallon  D: 03/01/2018 11:30     T: 03/01/2018 11:58  JOB #: [id number]

## 2018-03-22 ENCOUNTER — HOSPITAL ENCOUNTER (OUTPATIENT)
Dept: WOUND CARE | Age: 83
Discharge: HOME | End: 2018-03-22
Payer: MEDICARE

## 2018-03-22 PROCEDURE — 99214 OFFICE O/P EST MOD 30 MIN: CPT

## 2018-03-22 NOTE — PROGRESS NOTES
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 Today, the patient is complaining of pain on her \"bottom. \" She is not sure how long that has been present. Unfortunately, she is in a wheelchair, and we are unable to access the area of concern, and this was discussed with her daughter. There have been no other changes noted in the patient's medications, allergies or review of systems. SUBJECTIVE:  Her temperature is 98, pulse 59, respirations 16, blood pressure 129/82. Wound #1 to the left anterior lower leg is smaller at 1 x 1.2 x 0.1 cm. Wound #10 has healed. Wound #8 of the right medial lower leg is smaller at 0.7 x 2 x 0.1 cm. Wound #9 of the right anterior lower leg is smaller a 1 x 2 x 0.1 cm. All wounds are clean and not in need of debridement. Therefore, we are going to continue Aquacel Ag every other day, after using Hibiclens every other day. She will keep her legs elevated. She will do gastrocnemius muscle exercises. She will float her right heel, where she had a previous right pressure ulcer (L89.611). Moisturizing cream will be applied every day or every other day. When she gets back to her nursing home, her daughter is going to make sure that when she is in her bed that they are able to examine her sacrum to make sure that there is not a pressure wound, and if so, she must offload the area and appropriate dressings will be applied. Next time, she should arrive in a stretcher, so we can better access her sacrum to make sure we can evaluate and treat that area. All questions were answered. Her condition on discharge is stable.       Joeann Rubinstein, MD AMK / ANDREIA  D: 2018 11:07     T: 2018 11:33  JOB #: [id number]

## 2018-04-12 ENCOUNTER — HOSPITAL ENCOUNTER (OUTPATIENT)
Dept: WOUND CARE | Age: 83
Discharge: HOME | End: 2018-04-12
Payer: MEDICARE

## 2018-04-12 PROCEDURE — 97597 DBRDMT OPN WND 1ST 20 CM/<: CPT

## 2018-04-12 RX ORDER — LIDOCAINE HYDROCHLORIDE 20 MG/ML
JELLY TOPICAL
Status: COMPLETED | OUTPATIENT
Start: 2018-04-12 | End: 2018-04-12

## 2018-04-12 RX ADMIN — LIDOCAINE HYDROCHLORIDE: 20 JELLY TOPICAL at 11:16

## 2018-04-12 NOTE — PROGRESS NOTES
[address] [address] CARE PROGRESS NOTE    Malika Peña  MR#: [id number]  : 10/05/1931  ACCOUNT #: [de-identified]   DATE OF SERVICE: 2018    SUBJECTIVE:  The patient returns for treatment of her bilateral venous leg ulcers I87.313, L97.911, L97.921 secondary to lymphedema I89.0. They are using Aquacel Ag and no compression and she seems to be improving over time. OBJECTIVE:  On exam today, she is somewhat lethargic, but conversant. Her temperature is 97.8, pulse 56, respirations 16, blood pressure 127/73. The left leg ulcer is now improved to 0.3 x 0.4 x 0.1 and this is a 99% improvement at week 81. The posterior right lower leg ulcer is healed and the anterior leg ulcer has some hypertrophic quality to it, but it measures 0.5 x 0.7 x 0.1 and this is also improving, but has a hypertrophic quality to it in need of some chemical silver nitrate cauterization. PROCEDURE:  After discussing risks and benefits, obtaining informed consent, identifying the patient, and taking a timeout to discuss selective debridement and silver nitrate application, Xylocaine gel was applied. Silver nitrate was used to cauterize the right anterior lower leg ulcer and a 5 mm ring curette was used to remove slough from the left lower leg ulcer. PLAN:  We will continue with the Aquacel AG as this is healing things nicely. She will see the clinic back in 1 week.       Tammie Goldberg, MD ETN / TAMI  D: 2018 11:27     T: 2018 11:37  JOB #: [id number]

## 2018-05-03 ENCOUNTER — HOSPITAL ENCOUNTER (OUTPATIENT)
Dept: WOUND CARE | Age: 83
Discharge: HOME | End: 2018-05-03
Payer: MEDICARE

## 2018-05-03 VITALS
HEART RATE: 65 BPM | DIASTOLIC BLOOD PRESSURE: 73 MMHG | SYSTOLIC BLOOD PRESSURE: 124 MMHG | RESPIRATION RATE: 16 BRPM | TEMPERATURE: 98.6 F

## 2018-05-03 PROBLEM — I89.0 SECONDARY LYMPHEDEMA: Status: ACTIVE | Noted: 2018-05-03

## 2018-05-03 PROBLEM — L97.911 NON-PRESSURE CHRONIC ULCER OF RIGHT LOWER LEG, LIMITED TO BREAKDOWN OF SKIN (HCC): Status: ACTIVE | Noted: 2018-05-03

## 2018-05-03 PROBLEM — L97.921 NON-PRESSURE CHRONIC ULCER OF LEFT LOWER LEG, LIMITED TO BREAKDOWN OF SKIN (HCC): Status: ACTIVE | Noted: 2018-05-03

## 2018-05-03 PROCEDURE — 74011000250 HC RX REV CODE- 250: Performed by: OTOLARYNGOLOGY

## 2018-05-03 PROCEDURE — 99214 OFFICE O/P EST MOD 30 MIN: CPT

## 2018-05-03 RX ORDER — LIDOCAINE HYDROCHLORIDE 20 MG/ML
JELLY TOPICAL
Status: COMPLETED | OUTPATIENT
Start: 2018-05-03 | End: 2018-05-03

## 2018-05-03 RX ADMIN — LIDOCAINE HYDROCHLORIDE 5 ML: 20 JELLY TOPICAL at 11:31

## 2018-05-03 NOTE — PROGRESS NOTES
[address] [facility]  WOUND CARE PROGRESS NOTE    Jennifer Sousa  MR#: [id number]  : 10/05/1931  ACCOUNT #: [de-identified]   DATE OF SERVICE: 2018    The patient returns for treatment of bilateral venous leg ulcers I87.313, L97.911, L97.921 in a patient with a secondary lymphedema I89.0. She was last seen by Dr. Marianna Tan 3 weeks ago. She was looking quite good. He continued Aquacel AG after using silver nitrate on her wounds. She has had no problems since last seen, no changes in her medications, allergies or review of systems. Her temperature is 98.6, pulse 65, respirations 16, blood pressure 124/73. The wound of left leg looks excellent at 0.2 x 0.2 x 0.1 cm. Wound to the right lower leg is 1.9 x 4 x 0.1 cm which is a cluster of 3 wounds which were quite friable. The wound to the right lateral lower leg is 1 x 1 x 0.1 cm which is a very clean superficial wound. The wounds were gently cleaned just with a dry gauze. Her legs are quite edematous and obviously being maintained in a dependent position. ASSESSMENT AND PLAN:  We will continue the exact same regimen of Aquacel AG, and  leg elevation, which was stressed to the patient's daughter. We are simply going to see her again in followup in 1 month since we are not going to change her regimen significantly at this point. All questions were answered. CONDITION ON DISCHARGE:  Stable.       MD MELINDA Khan /   D: 2018 12:02     T: 2018 12:31  JOB #: [id number]

## 2018-05-03 NOTE — WOUND CARE
Physical Exam WOUND POA CONDITIONS    Wound Leg Lower Left (Active)   DRESSING STATUS Clean;Clean, dry, and intact 5/3/2018 11:20 AM   Wound Length (cm) 0.2 cm 5/3/2018 11:20 AM   Wound Width (cm) 0.2 cm 5/3/2018 11:20 AM   Wound Depth (cm) 0.1 5/3/2018 11:20 AM   Wound Surface area (cm^2) 0.04 cm^2 5/3/2018 11:20 AM   Epithelialization (%) 75 5/3/2018 11:20 AM   Tissue Type Pink 5/3/2018 11:20 AM   Tissue Type Percent Pink 100 5/3/2018 11:20 AM   Drainage Amount  Small  5/3/2018 11:20 AM   Drainage Color Serous 5/3/2018 11:20 AM   Wound Odor None 5/3/2018 11:20 AM   Number of days:577       Wound Leg Lower Right (Active)   DRESSING STATUS Clean, dry, and intact 5/3/2018 11:20 AM   Wound Length (cm) 1.9 cm 5/3/2018 11:20 AM   Wound Width (cm) 4 cm 5/3/2018 11:20 AM   Wound Depth (cm) 0.1 5/3/2018 11:20 AM   Wound Surface area (cm^2) 7.6 cm^2 5/3/2018 11:20 AM   Tissue Type Percent Black 50 % 5/3/2018 11:20 AM   Tissue Type Percent Red 50 5/3/2018 11:20 AM   Drainage Amount  Small  5/3/2018 11:20 AM   Drainage Color Serous 5/3/2018 11:20 AM   Wound Odor None 5/3/2018 11:20 AM   Number of days:577       Wound Ankle Lateral;Right (Active)   DRESSING STATUS Other (comment) 5/3/2018 11:20 AM   DRESSING TYPE Open to air 5/3/2018 11:20 AM   Wound Length (cm) 1 cm 5/3/2018 11:20 AM   Wound Width (cm) 1 cm 5/3/2018 11:20 AM   Wound Depth (cm) 0.1 5/3/2018 11:20 AM   Wound Surface area (cm^2) 1 cm^2 5/3/2018 11:20 AM   Tissue Type Percent Pink 50 5/3/2018 11:20 AM   Tissue Type Percent Yellow 50 5/3/2018 11:20 AM   Drainage Amount  Small  5/3/2018 11:20 AM   Drainage Color Serous 5/3/2018 11:20 AM   Wound Odor None 5/3/2018 11:20 AM   Number of days:49

## 2018-06-07 ENCOUNTER — HOSPITAL ENCOUNTER (OUTPATIENT)
Dept: WOUND CARE | Age: 83
Discharge: HOME | End: 2018-06-07
Payer: MEDICARE

## 2018-06-07 VITALS
SYSTOLIC BLOOD PRESSURE: 106 MMHG | DIASTOLIC BLOOD PRESSURE: 69 MMHG | TEMPERATURE: 97.6 F | RESPIRATION RATE: 18 BRPM | HEART RATE: 58 BPM

## 2018-06-07 DIAGNOSIS — L97.911 NON-PRESSURE CHRONIC ULCER OF RIGHT LOWER LEG, LIMITED TO BREAKDOWN OF SKIN (HCC): ICD-10-CM

## 2018-06-07 DIAGNOSIS — I89.0 SECONDARY LYMPHEDEMA: ICD-10-CM

## 2018-06-07 DIAGNOSIS — L97.921 NON-PRESSURE CHRONIC ULCER OF LEFT LOWER LEG, LIMITED TO BREAKDOWN OF SKIN (HCC): ICD-10-CM

## 2018-06-07 PROCEDURE — 74011000250 HC RX REV CODE- 250: Performed by: OTOLARYNGOLOGY

## 2018-06-07 PROCEDURE — 17250 CHEM CAUT OF GRANLTJ TISSUE: CPT

## 2018-06-07 RX ORDER — LIDOCAINE HYDROCHLORIDE 20 MG/ML
JELLY TOPICAL
Status: COMPLETED | OUTPATIENT
Start: 2018-06-07 | End: 2018-06-07

## 2018-06-07 RX ADMIN — LIDOCAINE HYDROCHLORIDE 5 ML: 20 JELLY TOPICAL at 09:51

## 2018-06-07 NOTE — WOUND CARE
Discharge Condition:Stable  Ambulatory Status:Wheelchair  Discharge Destination:Facility   Transportation: Private Transportation   Accompanied by: Daughter in waiting room

## 2018-06-07 NOTE — PROGRESS NOTES
[facility] [address] CARE PROGRESS NOTE    Weston Cervantes  MR#: [id number]  : 10/05/1931  ACCOUNT #: [de-identified]   DATE OF SERVICE: 2018    SUBJECTIVE:  The patient returns for venous leg ulcers. Until today, she had bilateral venous leg ulcers, but today she only has wounds on the right side. Her daughter said that they have been very good at her nursing home and keeping her legs elevated and there have been no changes in her medicines, allergies or review of systems. PHYSICAL EXAMINATION:  VITAL SIGNS:  Temperature is 97.6, pulse 58, respirations 18, blood pressure 106/69. EXTREMITIES:  The left leg has healed all wounds and the edema is very well managed. On the right side, there is only one residual wound of the anterior right lower extremity. It measures 1 x 1.2 x 0.1 cm and is filled with hypergranulation tissue coming up out of the wound. It is recommended that this be cauterized. I explained the risks and benefits to the patient and her daughter. They understand and wish to proceed. Therefore, topical Xylocaine was applied for 10 minutes, we used silver nitrate. The entire hypergranulation tissue was involuted and made flat. It was then irrigated clear with saline. ASSESSMENT AND  PLAN:  We are going to switch the dressing to Mepilex AG foam to help keep the granulation tissue in check. She will keep legs elevated. I will see her again in about 3 weeks and hopefully she will completely heal by that point. All questions were answered. CONDITION ON DISCHARGE:  Stable.       MD MELINDA Moreno / KM  D: 2018 10:15     T: 2018 10:30  JOB #: [id number]

## 2018-06-07 NOTE — WOUND CARE
Visit Vitals    /69 (BP Patient Position: Sitting)    Pulse (!) 58    Temp 97.6 °F (36.4 °C)    Resp 18        06/07/18 0937   Wound Leg Lower Right   Date First Assessed/Time First Assessed: 10/03/16 1120   POA: Yes  Wound Type: Venous  Location: Leg Lower  Orientation: Right   Wound Length (cm) 1 cm   Wound Width (cm) 1.2 cm   Wound Depth (cm) 0.1   Wound Surface area (cm^2) 1.2 cm^2   Condition of Base Slough   Tissue Type Percent Black 50 %   Tissue Type Percent Red 50   Drainage Amount  Small    Drainage Color Serous   Wound Odor None   Cleansing and Cleansing Agents  Normal saline   Wound Leg Lower Left   Date First Assessed/Time First Assessed: 10/03/16 1119   POA: Yes  Wound Type: Venous  Location: Leg Lower  Orientation: Left   Wound Length (cm) 0.1 cm   Wound Width (cm) 0.1 cm   Wound Depth (cm) 0   Wound Surface area (cm^2) 0.01 cm^2   Wound Odor None   Wound Ankle Lateral;Right   Date First Assessed/Time First Assessed: 03/15/18 1126   POA: Yes  Wound Type: Venous  Location: Ankle  Orientation: Lateral;Right   Wound Length (cm) 0.1 cm   Wound Width (cm) 0.1 cm   Wound Depth (cm) 0   Wound Surface area (cm^2) 0.01 cm^2   Wound Odor None